# Patient Record
Sex: FEMALE | Race: WHITE | NOT HISPANIC OR LATINO | Employment: UNEMPLOYED | URBAN - METROPOLITAN AREA
[De-identification: names, ages, dates, MRNs, and addresses within clinical notes are randomized per-mention and may not be internally consistent; named-entity substitution may affect disease eponyms.]

---

## 2022-01-28 ENCOUNTER — OFFICE VISIT (OUTPATIENT)
Dept: URGENT CARE | Facility: CLINIC | Age: 47
End: 2022-01-28
Payer: COMMERCIAL

## 2022-01-28 VITALS
DIASTOLIC BLOOD PRESSURE: 100 MMHG | RESPIRATION RATE: 18 BRPM | OXYGEN SATURATION: 98 % | WEIGHT: 172.19 LBS | SYSTOLIC BLOOD PRESSURE: 165 MMHG | HEIGHT: 64 IN | TEMPERATURE: 97 F | HEART RATE: 82 BPM | BODY MASS INDEX: 29.4 KG/M2

## 2022-01-28 DIAGNOSIS — R82.90 ABNORMAL URINE FINDING: ICD-10-CM

## 2022-01-28 DIAGNOSIS — N30.01 ACUTE CYSTITIS WITH HEMATURIA: Primary | ICD-10-CM

## 2022-01-28 DIAGNOSIS — R35.0 URINARY FREQUENCY: ICD-10-CM

## 2022-01-28 DIAGNOSIS — R39.15 URINARY URGENCY: ICD-10-CM

## 2022-01-28 DIAGNOSIS — I10 ELEVATED BLOOD PRESSURE READING WITH DIAGNOSIS OF HYPERTENSION: ICD-10-CM

## 2022-01-28 LAB
BILIRUB UR QL STRIP: POSITIVE
GLUCOSE UR QL STRIP: POSITIVE
KETONES UR QL STRIP: NEGATIVE
LEUKOCYTE ESTERASE UR QL STRIP: NEGATIVE
PH, POC UA: 5
POC BLOOD, URINE: NEGATIVE
POC NITRATES, URINE: POSITIVE
PROT UR QL STRIP: POSITIVE
SP GR UR STRIP: 1.02 (ref 1–1.03)
UROBILINOGEN UR STRIP-ACNC: POSITIVE (ref 0.1–1.1)

## 2022-01-28 PROCEDURE — 1159F MED LIST DOCD IN RCRD: CPT | Mod: CPTII,S$GLB,, | Performed by: NURSE PRACTITIONER

## 2022-01-28 PROCEDURE — 4010F ACE/ARB THERAPY RXD/TAKEN: CPT | Mod: CPTII,S$GLB,, | Performed by: NURSE PRACTITIONER

## 2022-01-28 PROCEDURE — 3008F PR BODY MASS INDEX (BMI) DOCUMENTED: ICD-10-PCS | Mod: CPTII,S$GLB,, | Performed by: NURSE PRACTITIONER

## 2022-01-28 PROCEDURE — 3077F PR MOST RECENT SYSTOLIC BLOOD PRESSURE >= 140 MM HG: ICD-10-PCS | Mod: CPTII,S$GLB,, | Performed by: NURSE PRACTITIONER

## 2022-01-28 PROCEDURE — 3080F PR MOST RECENT DIASTOLIC BLOOD PRESSURE >= 90 MM HG: ICD-10-PCS | Mod: CPTII,S$GLB,, | Performed by: NURSE PRACTITIONER

## 2022-01-28 PROCEDURE — 99203 PR OFFICE/OUTPT VISIT, NEW, LEVL III, 30-44 MIN: ICD-10-PCS | Mod: S$GLB,,, | Performed by: NURSE PRACTITIONER

## 2022-01-28 PROCEDURE — 1160F RVW MEDS BY RX/DR IN RCRD: CPT | Mod: CPTII,S$GLB,, | Performed by: NURSE PRACTITIONER

## 2022-01-28 PROCEDURE — 3008F BODY MASS INDEX DOCD: CPT | Mod: CPTII,S$GLB,, | Performed by: NURSE PRACTITIONER

## 2022-01-28 PROCEDURE — 99203 OFFICE O/P NEW LOW 30 MIN: CPT | Mod: S$GLB,,, | Performed by: NURSE PRACTITIONER

## 2022-01-28 PROCEDURE — 1159F PR MEDICATION LIST DOCUMENTED IN MEDICAL RECORD: ICD-10-PCS | Mod: CPTII,S$GLB,, | Performed by: NURSE PRACTITIONER

## 2022-01-28 PROCEDURE — 81003 POCT URINALYSIS, DIPSTICK, AUTOMATED, W/O SCOPE: ICD-10-PCS | Mod: QW,S$GLB,, | Performed by: NURSE PRACTITIONER

## 2022-01-28 PROCEDURE — 1160F PR REVIEW ALL MEDS BY PRESCRIBER/CLIN PHARMACIST DOCUMENTED: ICD-10-PCS | Mod: CPTII,S$GLB,, | Performed by: NURSE PRACTITIONER

## 2022-01-28 PROCEDURE — 3077F SYST BP >= 140 MM HG: CPT | Mod: CPTII,S$GLB,, | Performed by: NURSE PRACTITIONER

## 2022-01-28 PROCEDURE — 4010F PR ACE/ARB THEARPY RXD/TAKEN: ICD-10-PCS | Mod: CPTII,S$GLB,, | Performed by: NURSE PRACTITIONER

## 2022-01-28 PROCEDURE — 81003 URINALYSIS AUTO W/O SCOPE: CPT | Mod: QW,S$GLB,, | Performed by: NURSE PRACTITIONER

## 2022-01-28 PROCEDURE — 3080F DIAST BP >= 90 MM HG: CPT | Mod: CPTII,S$GLB,, | Performed by: NURSE PRACTITIONER

## 2022-01-28 RX ORDER — PRAZOSIN HYDROCHLORIDE 1 MG/1
CAPSULE ORAL 2 TIMES DAILY
COMMUNITY
End: 2024-01-18

## 2022-01-28 RX ORDER — PHENAZOPYRIDINE HYDROCHLORIDE 200 MG/1
200 TABLET, FILM COATED ORAL 3 TIMES DAILY PRN
Qty: 9 TABLET | Refills: 0 | Status: SHIPPED | OUTPATIENT
Start: 2022-01-28 | End: 2022-01-28

## 2022-01-28 RX ORDER — LISINOPRIL 20 MG/1
20 TABLET ORAL DAILY
COMMUNITY
End: 2022-06-29 | Stop reason: CLARIF

## 2022-01-28 RX ORDER — NITROFURANTOIN 25; 75 MG/1; MG/1
100 CAPSULE ORAL 2 TIMES DAILY
Qty: 14 CAPSULE | Refills: 0 | Status: SHIPPED | OUTPATIENT
Start: 2022-01-28 | End: 2022-02-04

## 2022-01-28 RX ORDER — CLONAZEPAM 1 MG/1
1 TABLET ORAL 2 TIMES DAILY PRN
COMMUNITY
End: 2022-06-29

## 2022-01-28 RX ORDER — PHENAZOPYRIDINE HYDROCHLORIDE 200 MG/1
200 TABLET, FILM COATED ORAL 3 TIMES DAILY PRN
Qty: 9 TABLET | Refills: 0 | Status: SHIPPED | OUTPATIENT
Start: 2022-01-28 | End: 2022-01-31

## 2022-01-28 RX ORDER — BUPROPION HYDROCHLORIDE 150 MG/1
150 TABLET ORAL DAILY
COMMUNITY
End: 2023-03-14

## 2022-01-28 NOTE — PROGRESS NOTES
"Subjective:       Patient ID: Dahiana Rivera is a 46 y.o. female.    Vitals:  height is 5' 4" (1.626 m) and weight is 78.1 kg (172 lb 3.2 oz). Her temperature is 97.4 °F (36.3 °C). Her blood pressure is 165/100 (abnormal) and her pulse is 82. Her respiration is 18 and oxygen saturation is 98%.     Chief Complaint: Urinary Frequency    Urinary Frequency   This is a new problem. Episode onset: 5 days. The problem has been gradually worsening. The quality of the pain is described as burning. Associated symptoms include frequency and urgency. She has tried increased fluids (AZO) for the symptoms. The treatment provided no relief. Her past medical history is significant for recurrent UTIs.       Genitourinary: Positive for frequency and urgency.       Objective:      Physical Exam   Constitutional: She is oriented to person, place, and time. She appears well-developed and well-nourished.   HENT:   Head: Normocephalic and atraumatic.   Ears:   Right Ear: External ear normal.   Left Ear: External ear normal.   Nose: Nose normal. No nasal deformity. No epistaxis.   Mouth/Throat: Oropharynx is clear and moist and mucous membranes are normal.   Eyes: Lids are normal.   Neck: Trachea normal and phonation normal. Neck supple.   Cardiovascular: Normal pulses.   Pulmonary/Chest: Effort normal.   Abdominal: Normal appearance and bowel sounds are normal. She exhibits no distension. Soft. There is no abdominal tenderness. There is no CVA tenderness, no left CVA tenderness and no right CVA tenderness.   Neurological: She is alert and oriented to person, place, and time.   Skin: Skin is warm, dry and intact.   Psychiatric: She has a normal mood and affect. Her speech is normal and behavior is normal. Cognition and memory  Nursing note and vitals reviewed.        Results for orders placed or performed in visit on 01/28/22   POCT Urinalysis, Dipstick, Automated, W/O Scope   Result Value Ref Range    POC Blood, Urine Negative Negative "    POC Bilirubin, Urine Positive (A) Negative    POC Urobilinogen, Urine positive 0.1 - 1.1    POC Ketones, Urine Negative Negative    POC Protein, Urine Positive (A) Negative    POC Nitrates, Urine Positive (A) Negative    POC Glucose, Urine Positive (A) Negative    pH, UA 5.0     POC Specific Gravity, Urine 1.020 1.003 - 1.029    POC Leukocytes, Urine Negative Negative       Assessment:       1. Acute cystitis with hematuria    2. Urinary frequency    3. Urinary urgency    4. Elevated blood pressure reading with diagnosis of hypertension    5. Abnormal urine finding          Plan:     UTI positive for bilirubin, protein, nitrates, and glucose. Will treat for UTI. Low suspicion for pyelonephritis.   Acute cystitis with hematuria  -     Discontinue: phenazopyridine (PYRIDIUM) 200 MG tablet; Take 1 tablet (200 mg total) by mouth 3 (three) times daily as needed for Pain.  Dispense: 9 tablet; Refill: 0  -     nitrofurantoin, macrocrystal-monohydrate, (MACROBID) 100 MG capsule; Take 1 capsule (100 mg total) by mouth 2 (two) times daily. for 7 days  Dispense: 14 capsule; Refill: 0  -     phenazopyridine (PYRIDIUM) 200 MG tablet; Take 1 tablet (200 mg total) by mouth 3 (three) times daily as needed for Pain.  Dispense: 9 tablet; Refill: 0    Urinary frequency  -     POCT Urinalysis, Dipstick, Automated, W/O Scope  -     Discontinue: phenazopyridine (PYRIDIUM) 200 MG tablet; Take 1 tablet (200 mg total) by mouth 3 (three) times daily as needed for Pain.  Dispense: 9 tablet; Refill: 0  -     nitrofurantoin, macrocrystal-monohydrate, (MACROBID) 100 MG capsule; Take 1 capsule (100 mg total) by mouth 2 (two) times daily. for 7 days  Dispense: 14 capsule; Refill: 0    Urinary urgency  -     POCT Urinalysis, Dipstick, Automated, W/O Scope    Elevated blood pressure reading with diagnosis of hypertension    Abnormal urine finding          Patient Instructions                                                                        UTI   If your condition worsens or fails to improve we recommend that you receive another evaluation at the ER immediately or contact your PCP to discuss your concerns or return here. You must understand that you've received an urgent care treatment only and that you may be released before all your medical problems are known or treated. You the patient will arrange for followup care as instructed.   If you were prescribed antibiotics, please take them to full completion.    If you had cultures done it will take 3-5 days to result. We will call you with the result.   If you are are female and on BCP use additional methods to prevent pregnancy while on the antibiotics and for one cycle after.   Cranberry juice may help. Get the 100% cranberry juice and mix 4 oz of juice with 4 oz of water and drink this 8 oz glass of liquid once a day.

## 2022-06-29 ENCOUNTER — OFFICE VISIT (OUTPATIENT)
Dept: FAMILY MEDICINE | Facility: CLINIC | Age: 47
End: 2022-06-29
Payer: COMMERCIAL

## 2022-06-29 VITALS
RESPIRATION RATE: 18 BRPM | OXYGEN SATURATION: 100 % | BODY MASS INDEX: 29.02 KG/M2 | TEMPERATURE: 98 F | WEIGHT: 170 LBS | HEART RATE: 97 BPM | HEIGHT: 64 IN

## 2022-06-29 DIAGNOSIS — I10 PRIMARY HYPERTENSION: ICD-10-CM

## 2022-06-29 DIAGNOSIS — Z12.11 SCREENING FOR MALIGNANT NEOPLASM OF COLON: Primary | ICD-10-CM

## 2022-06-29 DIAGNOSIS — F31.9 BIPOLAR AFFECTIVE DISORDER, REMISSION STATUS UNSPECIFIED: ICD-10-CM

## 2022-06-29 DIAGNOSIS — Z72.0 TOBACCO USER: ICD-10-CM

## 2022-06-29 DIAGNOSIS — Z13.1 SCREENING FOR DIABETES MELLITUS: ICD-10-CM

## 2022-06-29 DIAGNOSIS — M05.80 POLYARTHRITIS WITH POSITIVE RHEUMATOID FACTOR: ICD-10-CM

## 2022-06-29 DIAGNOSIS — M25.50 POLYARTHRALGIA: ICD-10-CM

## 2022-06-29 DIAGNOSIS — Z12.39 ENCOUNTER FOR SCREENING FOR MALIGNANT NEOPLASM OF BREAST, UNSPECIFIED SCREENING MODALITY: ICD-10-CM

## 2022-06-29 PROCEDURE — 3008F PR BODY MASS INDEX (BMI) DOCUMENTED: ICD-10-PCS | Mod: CPTII,,, | Performed by: FAMILY MEDICINE

## 2022-06-29 PROCEDURE — 1159F MED LIST DOCD IN RCRD: CPT | Mod: CPTII,,, | Performed by: FAMILY MEDICINE

## 2022-06-29 PROCEDURE — 4010F ACE/ARB THERAPY RXD/TAKEN: CPT | Mod: CPTII,,, | Performed by: FAMILY MEDICINE

## 2022-06-29 PROCEDURE — 1160F RVW MEDS BY RX/DR IN RCRD: CPT | Mod: CPTII,,, | Performed by: FAMILY MEDICINE

## 2022-06-29 PROCEDURE — 99204 PR OFFICE/OUTPT VISIT, NEW, LEVL IV, 45-59 MIN: ICD-10-PCS | Mod: S$PBB,,, | Performed by: FAMILY MEDICINE

## 2022-06-29 PROCEDURE — 3008F BODY MASS INDEX DOCD: CPT | Mod: CPTII,,, | Performed by: FAMILY MEDICINE

## 2022-06-29 PROCEDURE — 99204 OFFICE O/P NEW MOD 45 MIN: CPT | Mod: S$PBB,,, | Performed by: FAMILY MEDICINE

## 2022-06-29 PROCEDURE — 1159F PR MEDICATION LIST DOCUMENTED IN MEDICAL RECORD: ICD-10-PCS | Mod: CPTII,,, | Performed by: FAMILY MEDICINE

## 2022-06-29 PROCEDURE — 1160F PR REVIEW ALL MEDS BY PRESCRIBER/CLIN PHARMACIST DOCUMENTED: ICD-10-PCS | Mod: CPTII,,, | Performed by: FAMILY MEDICINE

## 2022-06-29 PROCEDURE — 4010F PR ACE/ARB THEARPY RXD/TAKEN: ICD-10-PCS | Mod: CPTII,,, | Performed by: FAMILY MEDICINE

## 2022-06-29 PROCEDURE — 99215 OFFICE O/P EST HI 40 MIN: CPT | Mod: PBBFAC | Performed by: FAMILY MEDICINE

## 2022-06-29 RX ORDER — DEXTROAMPHETAMINE SACCHARATE, AMPHETAMINE ASPARTATE, DEXTROAMPHETAMINE SULFATE AND AMPHETAMINE SULFATE 7.5; 7.5; 7.5; 7.5 MG/1; MG/1; MG/1; MG/1
1 TABLET ORAL 2 TIMES DAILY
COMMUNITY
Start: 2022-06-23

## 2022-06-29 RX ORDER — LISINOPRIL 30 MG/1
30 TABLET ORAL DAILY
Qty: 90 TABLET | Refills: 3 | Status: SHIPPED | OUTPATIENT
Start: 2022-06-29 | End: 2023-01-11 | Stop reason: ALTCHOICE

## 2022-06-29 RX ORDER — ARIPIPRAZOLE 2 MG/1
2 TABLET ORAL DAILY
COMMUNITY
Start: 2022-06-16 | End: 2024-01-18

## 2022-06-29 RX ORDER — CLONAZEPAM 1 MG/1
1 TABLET ORAL 2 TIMES DAILY
COMMUNITY

## 2022-06-29 RX ORDER — MELOXICAM 15 MG/1
15 TABLET ORAL DAILY PRN
Qty: 30 TABLET | Refills: 11 | Status: SHIPPED | OUTPATIENT
Start: 2022-06-29 | End: 2023-06-29

## 2022-06-29 RX ORDER — LISINOPRIL 10 MG/1
20 TABLET ORAL DAILY
COMMUNITY
End: 2022-06-29

## 2022-06-29 NOTE — PROGRESS NOTES
[facility]  [address] [address]  Phone: [phone]   Subjective:      Patient ID: Dahiana Rivera is a 46 y.o. female.    Chief Complaint: Establish Care and Leg Pain    Problem List Items Addressed This Visit        Psychiatric    Bipolar disorder    Overview     Has psychiatrist  On wellbutrin  Denies suicidal or homicidal ideations  Grandmother 98 yo  May 6th (GM  was 5/5); pt appropriately saddended              Cardiac/Vascular    Hypertension    Overview     The patient presents with essential hypertension.  The patient is tolerating the medication well and is in excellent compliance.  The patient is experiencing no side effects.  Counseling was offered regarding low salt diets.  The patient has a reduced salt intake.  The patient denies chest pain, palpitations, shortness of breath, dyspnea on exertion, left or murmur neck pain, nausea, vomiting, diaphoresis, paroxysmal nocturnal dyspnea, and orthopnea.   Hypertension Medications             lisinopriL 10 MG tablet Take 20 mg by mouth once daily.    prazosin (MINIPRESS) 1 MG Cap Take by mouth 2 (two) times daily.      [facility] -[facility] l[facility]           Relevant Medications    Increase to lisinopriL (PRINIVIL,ZESTRIL) 30 MG tablet    Other Relevant Orders    Comprehensive Metabolic Panel    TSH    Lipid Panel    CBC auto differential    Urinalysis       Endocrine    Screening for diabetes mellitus    Relevant Orders    Hemoglobin A1C       Orthopedic    Polyarthralgia    Overview     Pain in b/l hands with swelling, left ankle, right knee and left hip  Endorses morning stiffness for >30 mins but worse pain at night'  Pain 8/10 at worst  Mother has RA  C/o sciatic type pain today           Relevant Medications    Start meloxicam (MOBIC) 15 MG tablet    Other Relevant Orders    Sedimentation rate    C-reactive protein    Cyclic citrul peptide antibody,  IgG    CHAY IgG by IFA    Rheumatoid Factors, IgA, IgG, IgM       Other    Tobacco user    Overview     1/2 ppd x 20 yrs with 1 yr cessation             Other Visit Diagnoses     Screening for malignant neoplasm of colon    -  Primary    Relevant Orders    Ambulatory referral/consult to Gastroenterology          The patient's Health Maintenance was reviewed and the following appears to be due:   Health Maintenance Due   Topic Date Due    Hepatitis C Screening  Never done    Cervical Cancer Screening  Never done    Lipid Panel  Never done    COVID-19 Vaccine (1) Never done    Pneumococcal Vaccines (Age 0-64) (1 - PCV) Never done    Mammogram  Never done    Colorectal Cancer Screening  Never done       (PHQ-2 data if performed)  Depression Patient Health Questionnaire 6/29/2022   Over the last two weeks how often have you been bothered by little interest or pleasure in doing things Nearly every day   Over the last two weeks how often have you been bothered by feeling down, depressed or hopeless Nearly every day   PHQ-2 Total Score 6   Over the last two weeks how often have you been bothered by trouble falling or staying asleep, or sleeping too much More than half the days   Over the last two weeks how often have you been bothered by feeling tired or having little energy More than half the days   Over the last two weeks how often have you been bothered by a poor appetite or overeating More than half the days   Over the last two weeks how often have you been bothered by feeling bad about yourself - or that you are a failure or have let yourself or your family down More than half the days   Over the last two weeks how often have you been bothered by trouble concentrating on things, such as reading the newspaper or watching television Not at all   Over the last two weeks how often have you been bothered by moving or speaking so slowly that other people could have noticed. Or the opposite - being so fidgety or  restless that you have been moving around a lot more than usual. Not at all   Over the last two weeks how often have you been bothered by thoughts that you would be better off dead, or of hurting yourself Not at all   Total Score 14   Interpretation Moderate     (PHQ-9 data if performed)  PHQ9 2022   Total Score 14     (JAY JAY data if performed)  GAD7 2022   1. Feeling nervous, anxious, or on edge? 2   2. Not being able to stop or control worrying? 2   3. Worrying too much about different things? 1   4. Trouble relaxing? 2   5. Being so restless that it is hard to sit still? 2   6. Becoming easily annoyed or irritable? 1   7. Feeling afraid as if something awful might happen? 1   8. If you checked off any problems, how difficult have these problems made it for you to do your work, take care of things at home, or get along with other people? 2   JAY JAY-7 Score 11        Past Medical History:  Past Medical History:   Diagnosis Date    Bipolar disorder     Depression     Hypertension     PTSD (post-traumatic stress disorder)      Past Surgical History:   Procedure Laterality Date     SECTION      GANGLION CYST EXCISION      HEMORRHOID SURGERY      TONSILLECTOMY       Review of patient's allergies indicates:   Allergen Reactions    Morphine     Opioids - morphine analogues      Current Outpatient Medications on File Prior to Visit   Medication Sig Dispense Refill    ARIPiprazole (ABILIFY) 2 MG Tab Take 2 mg by mouth once daily.      buPROPion (WELLBUTRIN XL) 150 MG TB24 tablet Take 150 mg by mouth once daily.      clonazePAM (KLONOPIN) 1 MG tablet Take 1 mg by mouth 2 (two) times a day.      dextroamphetamine-amphetamine 30 mg Tab Take 1 tablet by mouth 2 (two) times daily.      prazosin (MINIPRESS) 1 MG Cap Take by mouth 2 (two) times daily.      [DISCONTINUED] clonazePAM (KLONOPIN) 1 MG tablet Take 1 mg by mouth 2 (two) times daily as needed for Anxiety.      [DISCONTINUED] lisinopriL  (PRINIVIL,ZESTRIL) 20 MG tablet Take 20 mg by mouth once daily.      [DISCONTINUED] lisinopriL 10 MG tablet Take 20 mg by mouth once daily.       No current facility-administered medications on file prior to visit.     Social History     Socioeconomic History    Marital status: Single   Tobacco Use    Smoking status: Current Every Day Smoker     Packs/day: 0.50     Types: Cigarettes    Smokeless tobacco: Never Used   Substance and Sexual Activity    Alcohol use: Not Currently    Drug use: Never    Sexual activity: Not Currently     Family History   Problem Relation Age of Onset    Hypertension Maternal Grandfather     Hypertension Paternal Grandfather     Rheum arthritis Mother     Rheum arthritis Father     Cancer Maternal Aunt     Cancer Maternal Grandmother        Review of Systems   Constitutional: Negative for chills, fatigue and fever.   HENT: Negative for congestion, hearing loss, rhinorrhea, sore throat and voice change.    Eyes: Negative for visual disturbance.   Respiratory: Negative for cough, shortness of breath and wheezing.    Cardiovascular: Negative for chest pain, palpitations and leg swelling.   Gastrointestinal: Negative for abdominal pain, blood in stool, constipation, diarrhea, nausea and vomiting.   Endocrine: Negative for cold intolerance, heat intolerance, polydipsia, polyphagia and polyuria.   Genitourinary: Negative for decreased urine volume, difficulty urinating, flank pain, frequency, hematuria and urgency.   Musculoskeletal: Positive for arthralgias and joint swelling. Negative for gait problem and myalgias.        Pain in b/l hands with swelling, left ankle, right knee and left hip   Skin: Negative for rash.   Neurological: Negative for dizziness, seizures, speech difficulty, weakness, numbness and headaches.   Hematological: Negative for adenopathy.   Psychiatric/Behavioral: Negative.  Negative for decreased concentration, dysphoric mood, hallucinations and suicidal  "ideas. The patient is not nervous/anxious.    All other systems reviewed and are negative.      Objective:   Pulse 97   Temp 97.9 °F (36.6 °C) (Oral)   Resp 18   Ht 5' 4" (1.626 m)   Wt 77.1 kg (170 lb)   SpO2 100%   BMI 29.18 kg/m²   Physical Exam  Vitals and nursing note reviewed.   Constitutional:       General: She is not in acute distress.     Appearance: Normal appearance. She is normal weight. She is not ill-appearing.   HENT:      Head: Normocephalic and atraumatic.      Right Ear: Tympanic membrane, ear canal and external ear normal.      Left Ear: Tympanic membrane, ear canal and external ear normal.      Nose: Nose normal.      Mouth/Throat:      Mouth: Mucous membranes are moist.   Eyes:      Extraocular Movements: Extraocular movements intact.      Conjunctiva/sclera: Conjunctivae normal.      Pupils: Pupils are equal, round, and reactive to light.   Cardiovascular:      Rate and Rhythm: Normal rate and regular rhythm.      Pulses: Normal pulses.      Heart sounds: Normal heart sounds. No murmur heard.    No friction rub. No gallop.   Pulmonary:      Effort: Pulmonary effort is normal. No respiratory distress.      Breath sounds: Normal breath sounds. No wheezing, rhonchi or rales.   Abdominal:      General: Abdomen is flat. Bowel sounds are normal.      Palpations: Abdomen is soft. There is no mass.      Tenderness: There is no abdominal tenderness. There is no guarding or rebound.      Hernia: No hernia is present.   Musculoskeletal:         General: Tenderness (TTP in b/l hands MCP and PIP jts,, left ankle, right knee and left hip) present. Swelling: b/l hands and right knee- mild. Normal range of motion.      Cervical back: Normal range of motion and neck supple.      Right lower leg: No edema.      Left lower leg: No edema.   Skin:     General: Skin is warm and dry.      Capillary Refill: Capillary refill takes less than 2 seconds.      Findings: No lesion or rash.   Neurological:      " General: No focal deficit present.      Mental Status: She is alert and oriented to person, place, and time. Mental status is at baseline.      Cranial Nerves: No cranial nerve deficit.      Sensory: No sensory deficit.      Gait: Gait normal.   Psychiatric:         Mood and Affect: Mood normal.         Behavior: Behavior normal.         Thought Content: Thought content normal.         Judgment: Judgment normal.          No visits with results within 1 Month(s) from this visit.   Latest known visit with results is:   Office Visit on 01/28/2022   Component Date Value Ref Range Status    POC Blood, Urine 01/28/2022 Negative  Negative Final    POC Bilirubin, Urine 01/28/2022 Positive (A) Negative Final    3.0    POC Urobilinogen, Urine 01/28/2022 positive  0.1 - 1.1 Final    POC Ketones, Urine 01/28/2022 Negative  Negative Final    POC Protein, Urine 01/28/2022 Positive (A) Negative Final    300    POC Nitrates, Urine 01/28/2022 Positive (A) Negative Final    POC Glucose, Urine 01/28/2022 Positive (A) Negative Final    500    pH, UA 01/28/2022 5.0   Final    POC Specific Gravity, Urine 01/28/2022 1.020  1.003 - 1.029 Final    POC Leukocytes, Urine 01/28/2022 Negative  Negative Final      Assessment:     1. Screening for malignant neoplasm of colon    2. Primary hypertension    3. Polyarthralgia    4. Bipolar affective disorder, remission status unspecified    5. Encounter for screening for malignant neoplasm of breast, unspecified screening modality    6. Screening for diabetes mellitus    7. Tobacco user      Plan:   I am having Dahiana Rivera start on lisinopriL  30 mg Daily and meloxicam. I am also having her maintain her buPROPion, prazosin, ARIPiprazole, clonazePAM, and dextroamphetamine-amphetamine.  Problem List Items Addressed This Visit        Psychiatric    Bipolar disorder       Cardiac/Vascular    Hypertension    Relevant Medications    lisinopriL (PRINIVIL,ZESTRIL) 30 MG tablet    Other Relevant  Orders    Comprehensive Metabolic Panel    TSH    Lipid Panel    CBC auto differential    Urinalysis       Endocrine    Screening for diabetes mellitus    Relevant Orders    Hemoglobin A1C       Orthopedic    Polyarthralgia    Relevant Medications    meloxicam (MOBIC) 15 MG tablet    Other Relevant Orders    Sedimentation rate    C-reactive protein    Cyclic citrul peptide antibody, IgG    CHAY IgG by IFA    Rheumatoid Factors, IgA, IgG, IgM       Other    Tobacco user      Other Visit Diagnoses     Screening for malignant neoplasm of colon    -  Primary    Relevant Orders    Ambulatory referral/consult to Gastroenterology        No follow-ups on file.      Medication List with Changes/Refills   New Medications    LISINOPRIL (PRINIVIL,ZESTRIL) 30 MG TABLET    Take 1 tablet (30 mg total) by mouth once daily.    MELOXICAM (MOBIC) 15 MG TABLET    Take 1 tablet (15 mg total) by mouth daily as needed for Pain.   Current Medications    ARIPIPRAZOLE (ABILIFY) 2 MG TAB    Take 2 mg by mouth once daily.    BUPROPION (WELLBUTRIN XL) 150 MG TB24 TABLET    Take 150 mg by mouth once daily.    CLONAZEPAM (KLONOPIN) 1 MG TABLET    Take 1 mg by mouth 2 (two) times a day.    DEXTROAMPHETAMINE-AMPHETAMINE 30 MG TAB    Take 1 tablet by mouth 2 (two) times daily.    PRAZOSIN (MINIPRESS) 1 MG CAP    Take by mouth 2 (two) times daily.   Discontinued Medications    CLONAZEPAM (KLONOPIN) 1 MG TABLET    Take 1 mg by mouth 2 (two) times daily as needed for Anxiety.    LISINOPRIL (PRINIVIL,ZESTRIL) 20 MG TABLET    Take 20 mg by mouth once daily.    LISINOPRIL 10 MG TABLET    Take 20 mg by mouth once daily.            Orders Placed This Encounter   Procedures    Mammo Digital Screening Bilat     Standing Status:   Future     Standing Expiration Date:   6/29/2024     Order Specific Question:   May the Radiologist modify the order per protocol to meet the clinical needs of the patient?     Answer:   Yes     Order Specific Question:   Release to  patient     Answer:   Immediate    Comprehensive Metabolic Panel     Standing Status:   Future     Standing Expiration Date:   8/28/2023    TSH     Standing Status:   Future     Standing Expiration Date:   8/28/2023    Lipid Panel     Standing Status:   Future     Standing Expiration Date:   8/28/2023    Hemoglobin A1C     Standing Status:   Future     Standing Expiration Date:   8/28/2023    CBC auto differential     Standing Status:   Future     Standing Expiration Date:   8/28/2023    Sedimentation rate     Standing Status:   Future     Standing Expiration Date:   8/28/2023    C-reactive protein     Standing Status:   Future     Standing Expiration Date:   8/28/2023    Cyclic citrul peptide antibody, IgG     Standing Status:   Future     Standing Expiration Date:   8/28/2023    Urinalysis    CHAY IgG by IFA     Standing Status:   Future     Standing Expiration Date:   8/28/2023    Rheumatoid Factors, IgA, IgG, IgM     Standing Status:   Future     Standing Expiration Date:   8/28/2023    Ambulatory referral/consult to Gastroenterology     Standing Status:   Future     Standing Expiration Date:   7/29/2023     Referral Priority:   Routine     Referral Type:   Consultation     Referral Reason:   Specialty Services Required     Referred to Provider:   Stefan Blake MD     Requested Specialty:   Gastroenterology     Number of Visits Requested:   1     Previous medical history/lab work/radiology reviewed and considered during medical management decisions.   Medication list reviewed and medication reconciliation performed.  Patient was provided  and care about his/her current diagnosis (es) and medications including risk/benefit and side effects/adverse events, over the counter medication uses/doses, home self-care and contact precautions,  and red flags and indications for when to seek immediate medical attention.   Patient was advised to continue compliance with current medication list and  medical recommendations.  Recommended/ Advised continued compliance with recommended eating habits/ diets for medical conditions and exercise 150 minutes/ week (if possible) for medical condition (s).  Educational handouts and instructions on selected disease management in AVS (After Visit Summary).  All of the patient's questions were answered to patient's satisfaction.   The patient was receptive, expressed verbal understanding and agreement the above plan.    This note was created with the assistance of  M*Modal Fluency Direct voice recognition or  phone dictation. Please excuse any typographical errors that might have transpired. There may be transcription errors as a result of using this technology, however minimal effort has been made to assure accuracy of transcription, but any obvious errors or omissions should be clarified with the author of the document      Most recent labs reviewed (7/5/2022)and are within normal limits except Elevated  Liver Function Tests with AST/ALT 50/63 but normal alkaline phosphatase; rheumatoid factor IgM is elevated 12 but CCP negative; thyroid-TSH elevated 4.99.  Medical management  to be addressed by referred to IM or FM provider on future visit with recommendations:  Repeat TSH with free T3 and free T4; repeat liver function tests with ultrasound of abdomen limited.  Patient will be referred to rheumatology for polyarthralgia with increased IgM rheumatoid factor 12 and family history of rheumatoid arthritis (mother).  .     Follow up with  administrative management referral to Internal Medicine or Family Medicine (as of 7/8/2022) for  medical conditions above in 6 wks from this visit  Regi Castellanos MD

## 2022-07-05 ENCOUNTER — APPOINTMENT (OUTPATIENT)
Dept: FAMILY MEDICINE | Facility: CLINIC | Age: 47
End: 2022-07-05
Payer: COMMERCIAL

## 2022-07-05 DIAGNOSIS — Z13.1 SCREENING FOR DIABETES MELLITUS: ICD-10-CM

## 2022-07-05 DIAGNOSIS — M25.50 POLYARTHRALGIA: ICD-10-CM

## 2022-07-05 DIAGNOSIS — I10 PRIMARY HYPERTENSION: ICD-10-CM

## 2022-07-05 LAB
ALBUMIN SERPL-MCNC: 3.8 GM/DL (ref 3.5–5)
ALBUMIN/GLOB SERPL: 1.1 RATIO (ref 1.1–2)
ALP SERPL-CCNC: 83 UNIT/L (ref 40–150)
ALT SERPL-CCNC: 63 UNIT/L (ref 0–55)
AST SERPL-CCNC: 50 UNIT/L (ref 5–34)
BASOPHILS # BLD AUTO: 0.03 X10(3)/MCL (ref 0–0.2)
BASOPHILS NFR BLD AUTO: 0.6 %
BILIRUBIN DIRECT+TOT PNL SERPL-MCNC: 0.8 MG/DL
BUN SERPL-MCNC: 13.2 MG/DL (ref 7–18.7)
CALCIUM SERPL-MCNC: 10.1 MG/DL (ref 8.4–10.2)
CHLORIDE SERPL-SCNC: 104 MMOL/L (ref 98–107)
CHOLEST SERPL-MCNC: 189 MG/DL
CHOLEST/HDLC SERPL: 3 {RATIO} (ref 0–5)
CO2 SERPL-SCNC: 31 MMOL/L (ref 22–29)
CREAT SERPL-MCNC: 0.82 MG/DL (ref 0.55–1.02)
CRP SERPL-MCNC: 0.7 MG/L
EOSINOPHIL # BLD AUTO: 0.2 X10(3)/MCL (ref 0–0.9)
EOSINOPHIL NFR BLD AUTO: 4.2 %
ERYTHROCYTE [DISTWIDTH] IN BLOOD BY AUTOMATED COUNT: 14.1 % (ref 11.5–17)
ERYTHROCYTE [SEDIMENTATION RATE] IN BLOOD: 8 MM/HR (ref 0–20)
EST. AVERAGE GLUCOSE BLD GHB EST-MCNC: 114 MG/DL
GLOBULIN SER-MCNC: 3.4 GM/DL (ref 2.4–3.5)
GLUCOSE SERPL-MCNC: 90 MG/DL (ref 74–100)
HBA1C MFR BLD: 5.6 %
HCT VFR BLD AUTO: 40.8 % (ref 37–47)
HDLC SERPL-MCNC: 71 MG/DL (ref 35–60)
HGB BLD-MCNC: 13.2 GM/DL (ref 12–16)
IMM GRANULOCYTES # BLD AUTO: 0 X10(3)/MCL (ref 0–0.04)
IMM GRANULOCYTES NFR BLD AUTO: 0 %
LDLC SERPL CALC-MCNC: 105 MG/DL (ref 50–140)
LYMPHOCYTES # BLD AUTO: 1.69 X10(3)/MCL (ref 0.6–4.6)
LYMPHOCYTES NFR BLD AUTO: 35.8 %
MCH RBC QN AUTO: 29.1 PG (ref 27–31)
MCHC RBC AUTO-ENTMCNC: 32.4 MG/DL (ref 33–36)
MCV RBC AUTO: 90.1 FL (ref 80–94)
MONOCYTES # BLD AUTO: 0.53 X10(3)/MCL (ref 0.1–1.3)
MONOCYTES NFR BLD AUTO: 11.2 %
NEUTROPHILS # BLD AUTO: 2.3 X10(3)/MCL (ref 2.1–9.2)
NEUTROPHILS NFR BLD AUTO: 48.2 %
NRBC BLD AUTO-RTO: 0 %
PLATELET # BLD AUTO: 226 X10(3)/MCL (ref 130–400)
PMV BLD AUTO: 10.1 FL (ref 7.4–10.4)
POTASSIUM SERPL-SCNC: 4.4 MMOL/L (ref 3.5–5.1)
PROT SERPL-MCNC: 7.2 GM/DL (ref 6.4–8.3)
RBC # BLD AUTO: 4.53 X10(6)/MCL (ref 4.2–5.4)
SODIUM SERPL-SCNC: 140 MMOL/L (ref 136–145)
T4 FREE SERPL-MCNC: 0.82 NG/DL (ref 0.7–1.48)
TRIGL SERPL-MCNC: 65 MG/DL (ref 37–140)
TSH SERPL-ACNC: 5 UIU/ML (ref 0.35–4.94)
VLDLC SERPL CALC-MCNC: 13 MG/DL
WBC # SPEC AUTO: 4.7 X10(3)/MCL (ref 4.5–11.5)

## 2022-07-05 PROCEDURE — 80053 COMPREHEN METABOLIC PANEL: CPT

## 2022-07-05 PROCEDURE — 80061 LIPID PANEL: CPT

## 2022-07-05 PROCEDURE — 86140 C-REACTIVE PROTEIN: CPT

## 2022-07-05 PROCEDURE — 84439 ASSAY OF FREE THYROXINE: CPT

## 2022-07-05 PROCEDURE — 86200 CCP ANTIBODY: CPT

## 2022-07-05 PROCEDURE — 36415 COLL VENOUS BLD VENIPUNCTURE: CPT

## 2022-07-05 PROCEDURE — 84443 ASSAY THYROID STIM HORMONE: CPT

## 2022-07-05 PROCEDURE — 83516 IMMUNOASSAY NONANTIBODY: CPT

## 2022-07-06 LAB
ANA SER QL HEP2 SUBST: NORMAL
CCP IGG SERPL-ACNC: 16 UNITS

## 2022-07-08 LAB
RF IGA SER-ACNC: <5 UNITS
RF IGG SER-ACNC: <5 UNITS
RF IGM SER-ACNC: 12 UNITS

## 2022-08-08 ENCOUNTER — TELEPHONE (OUTPATIENT)
Dept: FAMILY MEDICINE | Facility: CLINIC | Age: 47
End: 2022-08-08
Payer: COMMERCIAL

## 2022-08-08 NOTE — TELEPHONE ENCOUNTER
Patient missed appt on 8/4/22 with Kinsey Sadler NP . Will discuss this with patient on 8/17/22 appt.              ----- Message from Regi Castellanos MD sent at 8/5/2022  6:49 PM CDT -----  Regarding: abnormal lab results and additional  management needed  Please notify the patient of labs results:    Most recent labs reviewed (7/5/2022)and are within normal limits except Elevated  Liver Function Tests with AST/ALT 50/63 but normal alkaline phosphatase; rheumatoid factor IgM is elevated 12 but CCP negative; thyroid-TSH elevated 4.99.  Patient has been referred to rheumatology for polyarthralgia with increased IgM rheumatoid factor 12 and family history of rheumatoid arthritis (mother).   Medical management  to be addressed by referred to IM or FM provider (news provider) on future visit with recommendations:  Repeat TSH with free T3 and free T4; repeat liver function tests with ultrasound of abdomen limited      PAR:  Please  notify clinic leaders of need for scheduling.   Please have patient scheduled with new provider within 1 -2 weeks , if not already done.     .          .

## 2022-08-17 ENCOUNTER — HOSPITAL ENCOUNTER (OUTPATIENT)
Dept: RADIOLOGY | Facility: HOSPITAL | Age: 47
Discharge: HOME | End: 2022-08-17
Attending: NURSE PRACTITIONER
Payer: COMMERCIAL

## 2022-08-17 ENCOUNTER — HOSPITAL ENCOUNTER (OUTPATIENT)
Dept: RADIOLOGY | Facility: HOSPITAL | Age: 47
Discharge: HOME | End: 2022-08-17
Attending: FAMILY MEDICINE
Payer: COMMERCIAL

## 2022-08-17 ENCOUNTER — OFFICE VISIT (OUTPATIENT)
Dept: FAMILY MEDICINE | Facility: CLINIC | Age: 47
End: 2022-08-17
Payer: COMMERCIAL

## 2022-08-17 VITALS
RESPIRATION RATE: 18 BRPM | HEIGHT: 64 IN | OXYGEN SATURATION: 100 % | HEART RATE: 86 BPM | WEIGHT: 175 LBS | DIASTOLIC BLOOD PRESSURE: 89 MMHG | TEMPERATURE: 98 F | BODY MASS INDEX: 29.88 KG/M2 | SYSTOLIC BLOOD PRESSURE: 151 MMHG

## 2022-08-17 DIAGNOSIS — R74.8 ELEVATED LIVER ENZYMES: ICD-10-CM

## 2022-08-17 DIAGNOSIS — Z12.39 ENCOUNTER FOR SCREENING FOR MALIGNANT NEOPLASM OF BREAST, UNSPECIFIED SCREENING MODALITY: ICD-10-CM

## 2022-08-17 DIAGNOSIS — B19.20 HEPATITIS C VIRUS INFECTION WITHOUT HEPATIC COMA, UNSPECIFIED CHRONICITY: Primary | ICD-10-CM

## 2022-08-17 DIAGNOSIS — M25.50 POLYARTHRALGIA: ICD-10-CM

## 2022-08-17 DIAGNOSIS — I10 PRIMARY HYPERTENSION: ICD-10-CM

## 2022-08-17 DIAGNOSIS — Z12.4 CERVICAL CANCER SCREENING: ICD-10-CM

## 2022-08-17 DIAGNOSIS — R79.89 ELEVATED TSH: ICD-10-CM

## 2022-08-17 DIAGNOSIS — M25.562 LEFT KNEE PAIN, UNSPECIFIED CHRONICITY: ICD-10-CM

## 2022-08-17 LAB
ALBUMIN SERPL-MCNC: 4 GM/DL (ref 3.5–5)
ALP SERPL-CCNC: 117 UNIT/L (ref 40–150)
ALT SERPL-CCNC: 66 UNIT/L (ref 0–55)
AST SERPL-CCNC: 44 UNIT/L (ref 5–34)
BILIRUBIN DIRECT+TOT PNL SERPL-MCNC: 0.2 MG/DL (ref 0–0.5)
BILIRUBIN DIRECT+TOT PNL SERPL-MCNC: 0.2 MG/DL (ref 0–0.8)
BILIRUBIN DIRECT+TOT PNL SERPL-MCNC: 0.4 MG/DL
GGT SERPL-CCNC: 51 U/L (ref 9–36)
PROT SERPL-MCNC: 7.3 GM/DL (ref 6.4–8.3)
T4 SERPL-MCNC: 8.78 UG/DL (ref 4.87–11.72)
TSH SERPL-ACNC: 2.08 UIU/ML (ref 0.35–4.94)

## 2022-08-17 PROCEDURE — 77063 BREAST TOMOSYNTHESIS BI: CPT | Mod: 26,,, | Performed by: RADIOLOGY

## 2022-08-17 PROCEDURE — 80074 ACUTE HEPATITIS PANEL: CPT | Performed by: NURSE PRACTITIONER

## 2022-08-17 PROCEDURE — 77067 SCR MAMMO BI INCL CAD: CPT | Mod: TC

## 2022-08-17 PROCEDURE — 77063 MAMMO DIGITAL SCREENING BILAT WITH TOMO: ICD-10-PCS | Mod: 26,,, | Performed by: RADIOLOGY

## 2022-08-17 PROCEDURE — 84436 ASSAY OF TOTAL THYROXINE: CPT | Performed by: NURSE PRACTITIONER

## 2022-08-17 PROCEDURE — 4010F ACE/ARB THERAPY RXD/TAKEN: CPT | Mod: CPTII,,, | Performed by: NURSE PRACTITIONER

## 2022-08-17 PROCEDURE — 82977 ASSAY OF GGT: CPT | Performed by: NURSE PRACTITIONER

## 2022-08-17 PROCEDURE — 4010F PR ACE/ARB THEARPY RXD/TAKEN: ICD-10-PCS | Mod: CPTII,,, | Performed by: NURSE PRACTITIONER

## 2022-08-17 PROCEDURE — 3077F PR MOST RECENT SYSTOLIC BLOOD PRESSURE >= 140 MM HG: ICD-10-PCS | Mod: CPTII,,, | Performed by: NURSE PRACTITIONER

## 2022-08-17 PROCEDURE — 99215 OFFICE O/P EST HI 40 MIN: CPT | Mod: PBBFAC | Performed by: NURSE PRACTITIONER

## 2022-08-17 PROCEDURE — 99214 OFFICE O/P EST MOD 30 MIN: CPT | Mod: S$PBB,,, | Performed by: NURSE PRACTITIONER

## 2022-08-17 PROCEDURE — 80076 HEPATIC FUNCTION PANEL: CPT | Performed by: NURSE PRACTITIONER

## 2022-08-17 PROCEDURE — 3079F DIAST BP 80-89 MM HG: CPT | Mod: CPTII,,, | Performed by: NURSE PRACTITIONER

## 2022-08-17 PROCEDURE — 1159F PR MEDICATION LIST DOCUMENTED IN MEDICAL RECORD: ICD-10-PCS | Mod: CPTII,,, | Performed by: NURSE PRACTITIONER

## 2022-08-17 PROCEDURE — 77067 MAMMO DIGITAL SCREENING BILAT WITH TOMO: ICD-10-PCS | Mod: 26,,, | Performed by: RADIOLOGY

## 2022-08-17 PROCEDURE — 3079F PR MOST RECENT DIASTOLIC BLOOD PRESSURE 80-89 MM HG: ICD-10-PCS | Mod: CPTII,,, | Performed by: NURSE PRACTITIONER

## 2022-08-17 PROCEDURE — 84443 ASSAY THYROID STIM HORMONE: CPT | Performed by: NURSE PRACTITIONER

## 2022-08-17 PROCEDURE — 3077F SYST BP >= 140 MM HG: CPT | Mod: CPTII,,, | Performed by: NURSE PRACTITIONER

## 2022-08-17 PROCEDURE — 1159F MED LIST DOCD IN RCRD: CPT | Mod: CPTII,,, | Performed by: NURSE PRACTITIONER

## 2022-08-17 PROCEDURE — 3008F PR BODY MASS INDEX (BMI) DOCUMENTED: ICD-10-PCS | Mod: CPTII,,, | Performed by: NURSE PRACTITIONER

## 2022-08-17 PROCEDURE — 3008F BODY MASS INDEX DOCD: CPT | Mod: CPTII,,, | Performed by: NURSE PRACTITIONER

## 2022-08-17 PROCEDURE — 73562 X-RAY EXAM OF KNEE 3: CPT | Mod: TC,LT

## 2022-08-17 PROCEDURE — 36415 COLL VENOUS BLD VENIPUNCTURE: CPT | Performed by: NURSE PRACTITIONER

## 2022-08-17 PROCEDURE — 77067 SCR MAMMO BI INCL CAD: CPT | Mod: 26,,, | Performed by: RADIOLOGY

## 2022-08-17 PROCEDURE — 99214 PR OFFICE/OUTPT VISIT, EST, LEVL IV, 30-39 MIN: ICD-10-PCS | Mod: S$PBB,,, | Performed by: NURSE PRACTITIONER

## 2022-08-17 NOTE — ASSESSMENT & PLAN NOTE
Labs discussed with patient, referral to Rheumatology has been initiated.  X-ray left knee pending will notify patient of results when they become available.  Continue medication meloxicam 50 mg p.o. as prescribed  Elevate, ice, rest.

## 2022-08-17 NOTE — PROGRESS NOTES
Patient Name: Dahiana Rivera   : 1975  MRN: [id number]     SUBJECTIVE:  Dahiana Rivera is a 46 y.o. female here for Knee Pain (Left knee) and Hypertension  .    46-year-old female presents to the clinic reporting of left knee pain.  Patient was seen on 2022 with the same issue as well with left hip pain possible sciatica.  She reports mother and father has a history of rheumatoid arthritis.  Patient stated this time the pain to the left knee is in the back of the knee and is tender to touch.  Denies any recent fall or trauma.  Patient is ambulating without assistant.  Rheumatology referral has been initiated patient awaiting appointment.  Patient is taken Mobic 15 mg once daily for polyarthralgia.    Blood pressure elevated in the clinic 151/89, patient is on lisinopril 30 mg p.o. once daily.  Patient relate the increase in her blood pressure due to her pain and to left knee and being anxious.  Blood pressure log given to patient, patient will purchase OTC blood pressure machine, patient will come back in 4 weeks for blood pressure evaluation and medication management.    Labs discussed at the bedside.  Will repeat TSH, T3, T4 due to elevated TSH 4.9983.                                                       Will repeat liver enzymes due to elevated AST 63 and ALT 50. Hep panel initiated, GGT lab initiated.  Ultrasound abdomen limited initiated. Patient state her ex- with history of hepatitis-C and also she used to be IV drug user.    Patient denies chest pain, shortness of breath, dyspnea on exertion, palpitations, peripheral edema, abdominal pain, nausea, vomiting, diarrhea, constipation, fatigue, fever, chills, dysuria,  hematuria, melena, or hematochezia        Clinic visit  2022 Polyarthralgia    Pain in b/l hands with swelling, left ankle, right knee and left hip  Endorses morning stiffness for >30 mins but worse pain at night'  Pain 8/10 at worst  Mother has RA  C/o sciatic type pain  "today  Rx mobic 15mg.              ALLERGIES:   Review of patient's allergies indicates:   Allergen Reactions    Morphine     Opioids - morphine analogues          ROS:  Review of Systems   Musculoskeletal:  Positive for joint pain (Back of knee pain.).   All other systems reviewed and are negative.      OBJECTIVE:  Vital signs  Vitals:    08/17/22 1253   BP: (!) 151/89   Pulse: 86   Resp: 18   Temp: 97.7 °F (36.5 °C)   TempSrc: Oral   SpO2: 100%   Weight: 79.4 kg (175 lb)   Height: 5' 4" (1.626 m)      Body mass index is 30.04 kg/m².    PHYSICAL EXAM:   Physical Exam  Vitals and nursing note reviewed.   Constitutional:       General: She is not in acute distress.     Appearance: Normal appearance. She is not ill-appearing, toxic-appearing or diaphoretic.   HENT:      Head: Normocephalic and atraumatic.   Eyes:      Extraocular Movements: Extraocular movements intact.      Pupils: Pupils are equal, round, and reactive to light.   Cardiovascular:      Rate and Rhythm: Normal rate and regular rhythm.      Pulses: Normal pulses.      Heart sounds: Normal heart sounds.   Pulmonary:      Effort: Pulmonary effort is normal.      Breath sounds: Normal breath sounds. No wheezing or rhonchi.   Abdominal:      Palpations: Abdomen is soft.   Musculoskeletal:         General: Tenderness (Left posterior knee tenderness with palpation, generalized bilateral joint line tenderness,, varus and valgus test negative.) present. No swelling, deformity or signs of injury. Normal range of motion.      Cervical back: Normal range of motion and neck supple. No rigidity or tenderness.      Right lower leg: No edema.      Left lower leg: No edema.   Lymphadenopathy:      Cervical: No cervical adenopathy.   Skin:     General: Skin is warm.      Capillary Refill: Capillary refill takes less than 2 seconds.      Findings: No rash.   Neurological:      General: No focal deficit present.      Mental Status: She is alert and oriented to person, " place, and time. Mental status is at baseline.      Motor: No weakness.      Coordination: Coordination normal.      Gait: Gait normal.   Psychiatric:         Mood and Affect: Mood normal.         Behavior: Behavior normal.         Thought Content: Thought content normal.         Judgment: Judgment normal.        ASSESSMENT/PLAN:  1. Cervical cancer screening  -     Ambulatory referral/consult to Gynecology    2. Elevated liver enzymes  Assessment & Plan:  Blood work pending will notify patient with results when they become available.    Orders:  -     Gamma GT  -     Hepatic function panel  -     Hepatitis Panel, Acute  -     US Abdomen Limited    3. Elevated TSH  Assessment & Plan:  Blood work pending will notify of results when they become available.    Orders:  -     TSH  -     T3  -     T4    4. Left knee pain, unspecified chronicity  Assessment & Plan:  Labs discussed with patient, referral to Rheumatology has been initiated.  X-ray left knee pending will notify patient of results when they become available.  Continue medication meloxicam 50 mg p.o. as prescribed  Elevate, ice, rest.      Orders:  -     X-Ray Knee 3 View Left    5. Polyarthralgia  Overview:  Pain in b/l hands with swelling, left ankle, right knee and left hip  Endorses morning stiffness for >30 mins but worse pain at night'  Pain 8/10 at worst  Mother has RA  C/o sciatic type pain today      Assessment & Plan:  Labs discussed with patient, referral to Rheumatology has been initiated.  X-ray left knee pending will notify patient of results when they become available.  Continue medication meloxicam 50 mg p.o. as prescribed  Elevate, ice, rest.      6. Primary hypertension  Overview:  The patient presents with essential hypertension.  The patient is tolerating the medication well and is in excellent compliance.  The patient is experiencing no side effects.  Counseling was offered regarding low salt diets.  The patient has a reduced salt intake.   The patient denies chest pain, palpitations, shortness of breath, dyspnea on exertion, left or murmur neck pain, nausea, vomiting, diaphoresis, paroxysmal nocturnal dyspnea, and orthopnea.   Hypertension Medications               lisinopriL 10 MG tablet Take 20 mg by mouth once daily.    prazosin (MINIPRESS) 1 MG Cap Take by mouth 2 (two) times daily.        Has been wtihout meds for 5 months -was on lisinopril          Assessment & Plan:  Elevated blood pressure in the clinic, patient state it can be due to her nervousness and left knee pain.  Patient currently on lisinopril 30 mg.  Blood pressure log given to patient.  Patient will come back in 4 weeks for re-evaluation of her blood pressure and medication adjustment.  Continue low-salt, low-cholesterol, low-fat diet.  Exercise up to 30 minutes a day, 5 days a week as tolerated.  Stay hydrated with fluids.           RESULTS:  No results found for this or any previous visit (from the past 1008 hour(s)).  Recent Results (from the past 1344 hour(s))   Comprehensive Metabolic Panel    Collection Time: 07/05/22  8:29 AM   Result Value Ref Range    Sodium Level 140 136 - 145 mmol/L    Potassium Level 4.4 3.5 - 5.1 mmol/L    Chloride 104 98 - 107 mmol/L    Carbon Dioxide 31 (H) 22 - 29 mmol/L    Glucose Level 90 74 - 100 mg/dL    Blood Urea Nitrogen 13.2 7.0 - 18.7 mg/dL    Creatinine 0.82 0.55 - 1.02 mg/dL    Calcium Level Total 10.1 8.4 - 10.2 mg/dL    Protein Total 7.2 6.4 - 8.3 gm/dL    Albumin Level 3.8 3.5 - 5.0 gm/dL    Globulin 3.4 2.4 - 3.5 gm/dL    Albumin/Globulin Ratio 1.1 1.1 - 2.0 ratio    Bilirubin Total 0.8 <=1.5 mg/dL    Alkaline Phosphatase 83 40 - 150 unit/L    Alanine Aminotransferase 63 (H) 0 - 55 unit/L    Aspartate Aminotransferase 50 (H) 5 - 34 unit/L    Estimated GFR-Non  >60 mls/min/1.73/m2   TSH    Collection Time: 07/05/22  8:29 AM   Result Value Ref Range    Thyroid Stimulating Hormone 4.9983 (H) 0.3500 - 4.9400 uIU/mL   Lipid  Panel    Collection Time: 07/05/22  8:29 AM   Result Value Ref Range    Cholesterol Total 189 <=200 mg/dL    HDL Cholesterol 71 (H) 35 - 60 mg/dL    Triglyceride 65 37 - 140 mg/dL    Cholesterol/HDL Ratio 3 0 - 5    Very Low Density Lipoprotein 13     LDL Cholesterol 105.00 50.00 - 140.00 mg/dL   Hemoglobin A1C    Collection Time: 07/05/22  8:29 AM   Result Value Ref Range    Hemoglobin A1c 5.6 <=7.0 %    Estimated Average Glucose 114.0 mg/dL   Sedimentation rate    Collection Time: 07/05/22  8:29 AM   Result Value Ref Range    Sed Rate 8 0 - 20 mm/hr   C-reactive protein    Collection Time: 07/05/22  8:29 AM   Result Value Ref Range    C-Reactive Protein 0.70 <5.00 mg/L   Cyclic citrul peptide antibody, IgG    Collection Time: 07/05/22  8:29 AM   Result Value Ref Range    Cyclic Citrullinated Peptide Ab, IgG 16 0 - 19 Units   CHAY IgG by IFA    Collection Time: 07/05/22  8:29 AM   Result Value Ref Range    Antinuclear Ab, HEp-2 Substrate <1:80 (Negative) <1:80 (Negative)   Rheumatoid Factors, IgA, IgG, IgM    Collection Time: 07/05/22  8:29 AM   Result Value Ref Range    Rheumatoid Factor, IgA by WENDIE <5 <=6 Units    Rheumatoid Factor, IgM by WENDIE 12 (H) <=6 Units    Rheumatoid Factor, IgG by WENDIE <5 <=6 Units   CBC with Differential    Collection Time: 07/05/22  8:29 AM   Result Value Ref Range    WBC 4.7 4.5 - 11.5 x10(3)/mcL    RBC 4.53 4.20 - 5.40 x10(6)/mcL    Hgb 13.2 12.0 - 16.0 gm/dL    Hct 40.8 37.0 - 47.0 %    MCV 90.1 80.0 - 94.0 fL    MCH 29.1 27.0 - 31.0 pg    MCHC 32.4 (L) 33.0 - 36.0 mg/dL    RDW 14.1 11.5 - 17.0 %    Platelet 226 130 - 400 x10(3)/mcL    MPV 10.1 7.4 - 10.4 fL    Neut % 48.2 %    Lymph % 35.8 %    Mono % 11.2 %    Eos % 4.2 %    Basophil % 0.6 %    Lymph # 1.69 0.6 - 4.6 x10(3)/mcL    Neut # 2.3 2.1 - 9.2 x10(3)/mcL    Mono # 0.53 0.1 - 1.3 x10(3)/mcL    Eos # 0.20 0 - 0.9 x10(3)/mcL    Baso # 0.03 0 - 0.2 x10(3)/mcL    IG# 0.00 0 - 0.04 x10(3)/mcL    IG% 0.0 %    NRBC% 0.0 %   T4,  Free    Collection Time: 07/05/22  8:29 AM   Result Value Ref Range    Thyroxine Free 0.82 0.70 - 1.48 ng/dL       Follow Up:  Follow up in about 4 weeks (around 9/14/2022).      Previous medical history/lab work/radiology reviewed and considered during medical management decisions.   Medication list reviewed and medication reconciliation performed.  Patient was provided  and care about his/her current diagnosis (es) and medications including risk/benefit and side effects/adverse events, over the counter medication uses/doses, home self-care and contact precautions,  and red flags and indications for when to seek immediate medical attention.   Patient was advised to continue compliance with current medication list and medical recommendations.  Patient dvised continued compliance with recommended eating habits/ diets for medical conditions and exercise 150 minutes/ week (if possible) for medical condition (s).  Educational handouts and instructions on selected disease management in AVS (After Visit Summary).    All of the patient's questions were answered to patient's satisfaction.   The patient was receptive, expressed verbal understanding and agreement the above plan.    This note was created with the assistance of a voice recognition software or phone dictation. There may be transcription errors as a result of using this technology however minimal. Effort has been made to assure accuracy of transcription but any obvious errors or omissions should be clarified with the author of the document

## 2022-08-17 NOTE — ASSESSMENT & PLAN NOTE
Elevated blood pressure in the clinic, patient state it can be due to her nervousness and left knee pain.  Patient currently on lisinopril 30 mg.  Blood pressure log given to patient.  Patient will come back in 4 weeks for re-evaluation of her blood pressure and medication adjustment.  Continue low-salt, low-cholesterol, low-fat diet.  Exercise up to 30 minutes a day, 5 days a week as tolerated.  Stay hydrated with fluids.

## 2022-08-18 ENCOUNTER — TELEPHONE (OUTPATIENT)
Dept: FAMILY MEDICINE | Facility: CLINIC | Age: 47
End: 2022-08-18
Payer: COMMERCIAL

## 2022-08-18 LAB
HAV IGM SERPL QL IA: NONREACTIVE
HBV CORE IGM SERPL QL IA: NONREACTIVE
HBV SURFACE AG SERPL QL IA: NONREACTIVE
HCV AB SERPL QL IA: REACTIVE
PATH REV: NORMAL
PATH REV: NORMAL

## 2022-08-18 NOTE — TELEPHONE ENCOUNTER
Called patient to give results. Patient verbalized understanding. No additional questions at this time.      ----- Message from ERICA Robles sent at 8/18/2022  2:23 PM CDT -----  PLEASE CALL PATIENTS WITH RESULTS, x-ray of left knee shows mild degenerative changes she is also called osteoarthritis.  Take Advil as directed on the label as needed for pain , stay active, lose weight, elevate when needed and ice.  Instructed patient still awaiting on lab test results regarding her elevated liver functions.  Will keep her updated.

## 2022-08-18 NOTE — PROGRESS NOTES
PLEASE CALL PATIENTS WITH RESULTS, x-ray of left knee shows mild degenerative changes she is also called osteoarthritis.  Take Advil as directed on the label as needed for pain , stay active, lose weight, elevate when needed and ice.  Instructed patient still awaiting on lab test results regarding her elevated liver functions.  Will keep her updated.

## 2022-08-19 ENCOUNTER — TELEPHONE (OUTPATIENT)
Dept: FAMILY MEDICINE | Facility: CLINIC | Age: 47
End: 2022-08-19
Payer: COMMERCIAL

## 2022-08-19 PROBLEM — B19.20 HEPATITIS C VIRUS INFECTION WITHOUT HEPATIC COMA: Status: ACTIVE | Noted: 2022-08-19

## 2022-08-19 LAB — T3 SERPL IA-MCNC: 192 NG/DL (ref 80–200)

## 2022-08-19 NOTE — PROGRESS NOTES
PLEASE CALL PATIENTS WITH RESULTS, please notify patient unfortunately her test results for hepatitis-C antibody is reactive.  Also her liver enzymes continued to be elevated.  An urgent Referral to Infectious Disease has been initiated.  As we discussed in the clinic it is treatable with oral medications.  Please come back to the clinic if needed.  Take care and will see you on your next appointment.

## 2022-08-19 NOTE — TELEPHONE ENCOUNTER
----- Message from ERICA Robles sent at 8/19/2022 10:35 AM CDT -----  PLEASE CALL PATIENTS WITH RESULTS, please notify patient unfortunately her test results for hepatitis-C antibody is reactive.  Also her liver enzymes continued to be elevated.  An urgent Referral to Infectious Disease has been initiated.  As we discussed in the clinic it is treatable with oral medications.  Please come back the clinic if needed.  Take care and will see you on your next appointment.

## 2022-09-06 ENCOUNTER — TELEPHONE (OUTPATIENT)
Dept: FAMILY MEDICINE | Facility: CLINIC | Age: 47
End: 2022-09-06
Payer: COMMERCIAL

## 2022-09-06 DIAGNOSIS — R76.8 HEPATITIS C ANTIBODY POSITIVE IN BLOOD: Primary | ICD-10-CM

## 2022-09-06 NOTE — TELEPHONE ENCOUNTER
Spoke to patient regarding this information. Patient verbalized understanding. Patient states she will do labs when she does her ultrasound.

## 2022-09-06 NOTE — TELEPHONE ENCOUNTER
----- Message from ERICA Robles sent at 9/6/2022  2:48 PM CDT -----  Regarding: RE: Hep C referral VL needed  Please instructed patient she needs to go to lab for blood draw regarding her hepatitis C.  Infection disease clinic requesting detailed hepatitis C blood work.  Blood work has been ordered all she has to do is to get it done, fasting blood work not required.  Thank you  ----- Message -----  From: Niurka Hobson MA  Sent: 9/6/2022   1:56 PM CDT  To: ERICA Robles  Subject: Hep C referral VL needed                         Good Afternoon Np Kinsey,    We need a Hep C vl lab in order to process the referral. Thank you for all that you do!

## 2022-09-12 ENCOUNTER — HOSPITAL ENCOUNTER (OUTPATIENT)
Dept: RADIOLOGY | Facility: HOSPITAL | Age: 47
Discharge: HOME | End: 2022-09-12
Attending: NURSE PRACTITIONER
Payer: COMMERCIAL

## 2022-09-12 DIAGNOSIS — R74.8 ELEVATED LIVER ENZYMES: ICD-10-CM

## 2022-09-12 PROCEDURE — 76705 ECHO EXAM OF ABDOMEN: CPT | Mod: TC

## 2022-09-12 NOTE — PROGRESS NOTES
PLEASE CALL PATIENTS WITH RESULTS, ultrasound of liver shows fatty liver.  Keep follow-up appointment with infection Disease Clinic regarding hepatitis-C.  Come back to clinic as needed.

## 2022-09-13 ENCOUNTER — TELEPHONE (OUTPATIENT)
Dept: FAMILY MEDICINE | Facility: CLINIC | Age: 47
End: 2022-09-13
Payer: COMMERCIAL

## 2022-09-13 NOTE — TELEPHONE ENCOUNTER
Called patient to give results. Patient verbalized understanding. No additional questions at this time.

## 2022-09-13 NOTE — TELEPHONE ENCOUNTER
----- Message from ERICA Robles sent at 9/12/2022  3:45 PM CDT -----  PLEASE CALL PATIENTS WITH RESULTS, ultrasound of liver shows fatty liver.  Keep follow-up appointment with infection Disease Clinic regarding hepatitis-C.  Come back to clinic as needed.

## 2022-09-15 ENCOUNTER — OFFICE VISIT (OUTPATIENT)
Dept: FAMILY MEDICINE | Facility: CLINIC | Age: 47
End: 2022-09-15
Payer: COMMERCIAL

## 2022-09-15 VITALS
HEART RATE: 92 BPM | DIASTOLIC BLOOD PRESSURE: 80 MMHG | HEIGHT: 64 IN | SYSTOLIC BLOOD PRESSURE: 133 MMHG | WEIGHT: 185.19 LBS | TEMPERATURE: 98 F | RESPIRATION RATE: 18 BRPM | OXYGEN SATURATION: 98 % | BODY MASS INDEX: 31.62 KG/M2

## 2022-09-15 DIAGNOSIS — R76.8 HEPATITIS C ANTIBODY POSITIVE IN BLOOD: ICD-10-CM

## 2022-09-15 DIAGNOSIS — I10 PRIMARY HYPERTENSION: Primary | ICD-10-CM

## 2022-09-15 PROCEDURE — 1159F PR MEDICATION LIST DOCUMENTED IN MEDICAL RECORD: ICD-10-PCS | Mod: CPTII,,, | Performed by: NURSE PRACTITIONER

## 2022-09-15 PROCEDURE — 3008F PR BODY MASS INDEX (BMI) DOCUMENTED: ICD-10-PCS | Mod: CPTII,,, | Performed by: NURSE PRACTITIONER

## 2022-09-15 PROCEDURE — 99213 PR OFFICE/OUTPT VISIT, EST, LEVL III, 20-29 MIN: ICD-10-PCS | Mod: S$PBB,,, | Performed by: NURSE PRACTITIONER

## 2022-09-15 PROCEDURE — 99214 OFFICE O/P EST MOD 30 MIN: CPT | Mod: PBBFAC | Performed by: NURSE PRACTITIONER

## 2022-09-15 PROCEDURE — 99213 OFFICE O/P EST LOW 20 MIN: CPT | Mod: S$PBB,,, | Performed by: NURSE PRACTITIONER

## 2022-09-15 PROCEDURE — 3075F SYST BP GE 130 - 139MM HG: CPT | Mod: CPTII,,, | Performed by: NURSE PRACTITIONER

## 2022-09-15 PROCEDURE — 1159F MED LIST DOCD IN RCRD: CPT | Mod: CPTII,,, | Performed by: NURSE PRACTITIONER

## 2022-09-15 PROCEDURE — 87522 HEPATITIS C REVRS TRNSCRPJ: CPT | Performed by: NURSE PRACTITIONER

## 2022-09-15 PROCEDURE — 3008F BODY MASS INDEX DOCD: CPT | Mod: CPTII,,, | Performed by: NURSE PRACTITIONER

## 2022-09-15 PROCEDURE — 36415 COLL VENOUS BLD VENIPUNCTURE: CPT | Performed by: NURSE PRACTITIONER

## 2022-09-15 PROCEDURE — 3079F PR MOST RECENT DIASTOLIC BLOOD PRESSURE 80-89 MM HG: ICD-10-PCS | Mod: CPTII,,, | Performed by: NURSE PRACTITIONER

## 2022-09-15 PROCEDURE — 1160F RVW MEDS BY RX/DR IN RCRD: CPT | Mod: CPTII,,, | Performed by: NURSE PRACTITIONER

## 2022-09-15 PROCEDURE — 4010F ACE/ARB THERAPY RXD/TAKEN: CPT | Mod: CPTII,,, | Performed by: NURSE PRACTITIONER

## 2022-09-15 PROCEDURE — 3079F DIAST BP 80-89 MM HG: CPT | Mod: CPTII,,, | Performed by: NURSE PRACTITIONER

## 2022-09-15 PROCEDURE — 1160F PR REVIEW ALL MEDS BY PRESCRIBER/CLIN PHARMACIST DOCUMENTED: ICD-10-PCS | Mod: CPTII,,, | Performed by: NURSE PRACTITIONER

## 2022-09-15 PROCEDURE — 4010F PR ACE/ARB THEARPY RXD/TAKEN: ICD-10-PCS | Mod: CPTII,,, | Performed by: NURSE PRACTITIONER

## 2022-09-15 PROCEDURE — 3075F PR MOST RECENT SYSTOLIC BLOOD PRESS GE 130-139MM HG: ICD-10-PCS | Mod: CPTII,,, | Performed by: NURSE PRACTITIONER

## 2022-09-15 NOTE — PROGRESS NOTES
Patient Name: Dahiana Rivera   : 1975  MRN: [id number]     SUBJECTIVE:  Dahiana Rivera is a 46 y.o. female here for Follow-up and Knee Pain  .    46-year-old female presents to the clinic for blood pressure follow-up.  Historically patient has been out of her blood pressure medications for the past 5 months.  On 2022 patient started on lisinopril 30 mg p.o. once daily.  Patient did not bring a blood pressure logs.  Patient state her blood pressure has been hanging in systolic  in the 130s and diastolic in 80s.  Patient states she is tolerating her blood pressure lisinopril without any complications or complaints.  Instructed patient to continue low-salt diet, low-cholesterol, low-fat diet.    Patient had tested positive for hepatitis C.  Patient is here for blood draw of hep C RNA quantitative, infection Disease clinic awaiting results before excepting patient.  Patient state her significant other has hep C and has not been treated.  Instructed patient to schedule her significant other for evaluation and referral for hep C treatment, patient verbalized.    Patient reports left knee discomfort, discussed x-rays.  Instructed patient we can refer her to physical therapy as well to orthopedic clinic .  Instructed patient weekend discussed this in details or her next follow-up appointment in 3 months.  Patient states she started to exercise and hopefully that can help with pain as well she started a weight loss regiment.  Patient agreed to plan of care and verbalized understanding.    Instructed patient to call gastroenterology Clinic Dr. Blake and follow-up regarding your colonoscopy.  Patient states she had received the letter from the clinic and she has the intention to call the office and schedule.    Rheumatology referral has been initiated.  Gyn referral has been initiated from last visit for cervical cancer screening.    Come back to clinic in 3 months for follow-up.  Continue medications as  "prescribed.           Visit on 08/17/2022  46-year-old female presents to the clinic reporting of left knee pain.  Patient was seen on June of 2022 with the same issue as well with left hip pain possible sciatica.  She reports mother and father has a history of rheumatoid arthritis.  Patient stated this time the pain to the left knee is in the back of the knee and is tender to touch.  Denies any recent fall or trauma.  Patient is ambulating without assistant.  Rheumatology referral has been initiated patient awaiting appointment.  Patient is taken Mobic 15 mg once daily for polyarthralgia.     Blood pressure elevated in the clinic 151/89, patient is on lisinopril 30 mg p.o. once daily.  Patient relate the increase in her blood pressure due to her pain and to left knee and being anxious.  Blood pressure log given to patient, patient will purchase OTC blood pressure machine, patient will come back in 4 weeks for blood pressure evaluation and medication management.        ALLERGIES:   Review of patient's allergies indicates:   Allergen Reactions    Morphine     Opioids - morphine analogues          ROS:  Review of Systems   Musculoskeletal:  Positive for joint pain (chronic left knee pain).       OBJECTIVE:  Vital signs  Vitals:    09/15/22 1358   BP: 133/80   Pulse: 92   Resp: 18   Temp: 98.1 °F (36.7 °C)   SpO2: 98%   Weight: 84 kg (185 lb 3 oz)   Height: 5' 4" (1.626 m)      Body mass index is 31.79 kg/m².    PHYSICAL EXAM:   Physical Exam  Vitals and nursing note reviewed.   Constitutional:       General: She is not in acute distress.     Appearance: Normal appearance. She is obese. She is not ill-appearing, toxic-appearing or diaphoretic.   HENT:      Head: Normocephalic and atraumatic.   Eyes:      Pupils: Pupils are equal, round, and reactive to light.   Cardiovascular:      Rate and Rhythm: Normal rate and regular rhythm.      Pulses: Normal pulses.      Heart sounds: Normal heart sounds.   Abdominal:      " Palpations: Abdomen is soft.      Tenderness: There is no abdominal tenderness.   Musculoskeletal:         General: Normal range of motion.      Cervical back: Normal range of motion.   Skin:     General: Skin is warm.      Capillary Refill: Capillary refill takes less than 2 seconds.      Findings: No rash.   Neurological:      General: No focal deficit present.      Mental Status: She is alert and oriented to person, place, and time. Mental status is at baseline.      Gait: Gait normal.   Psychiatric:         Mood and Affect: Mood normal.         Behavior: Behavior normal.         Thought Content: Thought content normal.         Judgment: Judgment normal.        ASSESSMENT/PLAN:  1. Primary hypertension  Overview:  The patient presents with essential hypertension.  The patient is tolerating the medication well and is in excellent compliance.  The patient is experiencing no side effects.  Counseling was offered regarding low salt diets.  The patient has a reduced salt intake.  The patient denies chest pain, palpitations, shortness of breath, dyspnea on exertion, left or murmur neck pain, nausea, vomiting, diaphoresis, paroxysmal nocturnal dyspnea, and orthopnea.   Hypertension Medications               lisinopriL 10 MG tablet Take 20 mg by mouth once daily.    prazosin (MINIPRESS) 1 MG Cap Take by mouth 2 (two) times daily.        Has been wtout meds for 5 months -was on lisinopril          Assessment & Plan:  Patient did not bring blood pressure log.  Instructed patient to bring log next visit.  Continue lisinopril 30 mg p.o. once daily, call for refills as needed.  Blood pressure today 133/80.  Follow low-salt, low-cholesterol, low-fat diet.  Stay active, exercise up to 30 minutes a day as tolerated, 5 days a week.  Stay hydrated with fluids specially water.  Stop smoking.  If you change of mind return refer you to smoke cessation, patient verbalized.  Eat fresh fruits and vegetables.  Return to clinic sooner  if needed.  Keep follow-up appointment in 3 months.      2. Hepatitis C antibody positive in blood  Assessment & Plan:  Hep C viral RNA-quantitative real-time PCR with reflux  has been drawn in the clinic.  Blood work pending.  Infection disease clinic awaiting results to schedule patient.  Practice safe sex.  Recommended for her partner to get tested and treated if needed.   Questions solicited and answered.  Patient verbalized.  Return in 3 months as discussed.    Orders:  -     Hepatitis C Viral(HCV) RNA, Quant Real-Time PCR w/Reflexs  -     Hepatitis C Virus Quantitative  -     Hepatitis C Virus Quantitative         RESULTS:  Recent Results (from the past 1008 hour(s))   Gamma GT    Collection Time: 08/17/22  1:21 PM   Result Value Ref Range    Gamma Glutamyl Transferase 51 (H) 9 - 36 U/L   Hepatic function panel    Collection Time: 08/17/22  1:21 PM   Result Value Ref Range    Albumin Level 4.0 3.5 - 5.0 gm/dL    Alkaline Phosphatase 117 40 - 150 unit/L    Alanine Aminotransferase 66 (H) 0 - 55 unit/L    Aspartate Aminotransferase 44 (H) 5 - 34 unit/L    Bilirubin Direct 0.2 0.0 - 0.5 mg/dL    Bilirubin Indirect 0.20 0.00 - 0.80 mg/dL    Bilirubin Total 0.4 <=1.5 mg/dL    Protein Total 7.3 6.4 - 8.3 gm/dL   TSH    Collection Time: 08/17/22  1:21 PM   Result Value Ref Range    Thyroid Stimulating Hormone 2.0782 0.3500 - 4.9400 uIU/mL   T3    Collection Time: 08/17/22  1:21 PM   Result Value Ref Range    T3 (Triiodothyronine), Total, S 192 80 - 200 ng/dL   Hepatitis Panel, Acute    Collection Time: 08/17/22  1:21 PM   Result Value Ref Range    Hepatitis A IgM Nonreactive Nonreactive    Hepatitis B Core IgM Nonreactive Nonreactive    Hepatitis B Surface Antigen Nonreactive Nonreactive    Hepatitis C Antibody Reactive (A) Nonreactive   T4 (In-House)    Collection Time: 08/17/22  1:21 PM   Result Value Ref Range    Thyroxine 8.78 4.87 - 11.72 ug/dL   Pathologist Interpretation    Collection Time: 08/17/22  1:21 PM    Result Value Ref Range    Pathology Review       Chemical abnormalities suggesting hepatic parenchymal injury; no specific evidence of biliary disease.    Pallavi Nair MD     Pathologist Interpretation    Collection Time: 08/17/22  1:21 PM   Result Value Ref Range    Pathology Review       The presence of hepatitis C antibody indicates past or present hepatitis C infection.    Pallavi Nair MD       X-Ray Knee 3 View Left    Result Date: 8/17/2022  EXAMINATION: Left knee three views CLINICAL HISTORY: Knee pain COMPARISON: None FINDINGS: No acute fracture subluxation.  There are mild degenerative changes of the medial compartment of the knee.  No erosions seen.  No joint effusion.     No acute osseous finding.  Mild degenerative changes of the medial compartment of the knee. Electronically signed by: Jona Shelton MD Date:    08/17/2022 Time:    15:06    US Abdomen Limited    Result Date: 9/12/2022  EXAMINATION: US ABDOMEN LIMITED CLINICAL HISTORY: elevated liver functions.;, Abnormal levels of other serum enzymes. TECHNIQUE: Transverse and longitudinal images of the right upper abdomen were obtained. COMPARISON: None FINDINGS: Liver: Size: 15.8 cm in the right midclavicular line, normal Appearance: There is some increased echogenicity of the liver parenchyma increased echogenicity decreases the sensitivity to detect focal lesions Mass: No focal masses Gallbladder: Stones/Sludge: None Appearance: No wall thickening, pericholecystic fluid or hydrops Sonographic Gao's Sign: Negative Bile Ducts: Intrahepatic Ducts: No dilatation Extrahepatic Ducts: Common bile duct measures 0.65 cm, no dilatation Pancreas: Pancreas is obscured by gas Right Kidney: Size: 10.3 cm in length Echogenicity: Normal Collecting System: No hydronephrosis Stone: None Cyst/Mass: None Vessels: Inferior Vena Cava: Visualized portions are normal. Main Portal Vein: Patent with hepatopedal  flow. Free Fluid: No ascites or pleural  effusions     Changes of hepatic steatosis with no other abnormality seen Electronically signed by: Dhaval Ny Date:    09/12/2022 Time:    10:27    Mammo Digital Screening Bilat w/ Guero    Result Date: 8/18/2022   - MAMMO DIGITAL SCREENING BILAT WITH GUERO BILATERAL DIGITAL SCREENING MAMMOGRAM 3D/2D WITH CAD: 8/17/2022 HISTORY: 46-year-old woman presents for screening mammogram.  COMPARISONS: No prior exams were available for comparison.  TECHNIQUE: Digital mammography views were performed with tomosynthesis. Current study was evaluated with a Computer Aided Detection (CAD) system. BREAST COMPOSITION: The breasts are heterogeneously dense, which may obscure small masses.  FINDINGS: No suspicious mass, asymmetry, distortion, or calcification is identified. IMPRESSION: NEGATIVE Right Breast: Negative (BI-RADS 1) Left Breast: Negative (BI-RADS 1) Recommendations: Recommend continued annual screening mammography (with Digital Breast Tomosynthesis), according to American College of Radiology guidelines. Elpidio Alicia M.D.          lm/:8/18/2022 07:45:15  letter sent: Mammography Normal  Mammogram BI-RADS: 1 Negative      Follow Up:  Follow up in about 3 months (around 12/15/2022).      Previous medical history/lab work/radiology reviewed and considered during medical management decisions.   Medication list reviewed and medication reconciliation performed.  Patient was provided  and care about his/her current diagnosis (es) and medications including risk/benefit and side effects/adverse events, over the counter medication uses/doses, home self-care and contact precautions,  and red flags and indications for when to seek immediate medical attention.   Patient was advised to continue compliance with current medication list and medical recommendations.  Patient dvised continued compliance with recommended eating habits/ diets for medical conditions and exercise 150 minutes/ week (if possible) for medical  condition (s).  Educational handouts and instructions on selected disease management in AVS (After Visit Summary).    All of the patient's questions were answered to patient's satisfaction.   The patient was receptive, expressed verbal understanding and agreement the above plan.         This note was created with the assistance of a voice recognition software or phone dictation. There may be transcription errors as a result of using this technology however minimal. Effort has been made to assure accuracy of transcription but any obvious errors or omissions should be clarified with the author of the document

## 2022-09-15 NOTE — ASSESSMENT & PLAN NOTE
Patient did not bring blood pressure log.  Instructed patient to bring log next visit.  Continue lisinopril 30 mg p.o. once daily, call for refills as needed.  Blood pressure today 133/80.  Follow low-salt, low-cholesterol, low-fat diet.  Stay active, exercise up to 30 minutes a day as tolerated, 5 days a week.  Stay hydrated with fluids specially water.  Stop smoking.  If you change of mind return refer you to smoke cessation, patient verbalized.  Eat fresh fruits and vegetables.  Return to clinic sooner if needed.  Keep follow-up appointment in 3 months.

## 2022-09-15 NOTE — ASSESSMENT & PLAN NOTE
Hep C viral RNA-quantitative real-time PCR with reflux  has been drawn in the clinic.  Blood work pending.  Infection disease clinic awaiting results to schedule patient.  Practice safe sex.  Recommended for her partner to get tested and treated if needed.   Questions solicited and answered.  Patient verbalized.  Return in 3 months as discussed.

## 2022-09-17 LAB — HCV RNA SERPL NAA+PROBE-ACNC: ABNORMAL IU/ML

## 2022-09-18 NOTE — PROGRESS NOTES
PLEASE CALL PATIENTS WITH RESULTS, hep C infection, instruct patient she should expect a call from infection Disease Clinic for hep C evaluation and treatment.  Keep follow-up appointment as discussed, return to clinic as needed.

## 2022-09-19 ENCOUNTER — TELEPHONE (OUTPATIENT)
Dept: FAMILY MEDICINE | Facility: CLINIC | Age: 47
End: 2022-09-19
Payer: COMMERCIAL

## 2022-09-19 NOTE — TELEPHONE ENCOUNTER
----- Message from ERICA Robles sent at 9/18/2022  5:17 PM CDT -----  PLEASE CALL PATIENTS WITH RESULTS, hep C infection, instruct patient she should expect a call from infection Disease Clinic for hep C evaluation and treatment.  Keep follow-up appointment as discussed, return to clinic as needed.

## 2022-10-03 DIAGNOSIS — B18.2 CHRONIC HEPATITIS C WITHOUT HEPATIC COMA: Primary | ICD-10-CM

## 2022-10-03 PROBLEM — Z13.1 SCREENING FOR DIABETES MELLITUS: Status: RESOLVED | Noted: 2022-06-29 | Resolved: 2022-10-03

## 2022-10-11 ENCOUNTER — TELEPHONE (OUTPATIENT)
Dept: INFECTIOUS DISEASES | Facility: CLINIC | Age: 47
End: 2022-10-11
Payer: COMMERCIAL

## 2022-10-11 NOTE — TELEPHONE ENCOUNTER
----- Message from Lenora Villalba sent at 10/11/2022  8:18 AM CDT -----  Regarding: Ultrasound  #GISSELL/NADINE    Pt was contacted by Central Scheduling to schedule an ultrasound ordered by Nadine. Pt had an ultrasound completed 9/12/22, and is concerned her insurance will not pay for another one. Please contact pt @ [phone] with further instruction.

## 2022-10-11 NOTE — TELEPHONE ENCOUNTER
No need for another abdominal u/s as pt recently had on on 9/12/2022. I called and explained this to pt. Abdominal u/s. ordered placed on 10/3/2022 discontinued. Pt verbalizes understanding

## 2022-11-03 ENCOUNTER — OFFICE VISIT (OUTPATIENT)
Dept: INFECTIOUS DISEASES | Facility: CLINIC | Age: 47
End: 2022-11-03
Payer: COMMERCIAL

## 2022-11-03 ENCOUNTER — PROCEDURE VISIT (OUTPATIENT)
Dept: INFECTIOUS DISEASES | Facility: CLINIC | Age: 47
End: 2022-11-03
Payer: COMMERCIAL

## 2022-11-03 VITALS
OXYGEN SATURATION: 98 % | HEART RATE: 121 BPM | WEIGHT: 178 LBS | DIASTOLIC BLOOD PRESSURE: 80 MMHG | SYSTOLIC BLOOD PRESSURE: 143 MMHG | RESPIRATION RATE: 20 BRPM | HEIGHT: 64 IN | BODY MASS INDEX: 30.39 KG/M2 | TEMPERATURE: 99 F

## 2022-11-03 DIAGNOSIS — B18.2 CHRONIC HEPATITIS C WITHOUT HEPATIC COMA: Primary | ICD-10-CM

## 2022-11-03 DIAGNOSIS — B18.2 CHRONIC HEPATITIS C WITHOUT HEPATIC COMA: ICD-10-CM

## 2022-11-03 DIAGNOSIS — B19.20 HEPATITIS C VIRUS INFECTION WITHOUT HEPATIC COMA, UNSPECIFIED CHRONICITY: ICD-10-CM

## 2022-11-03 LAB
ALBUMIN SERPL-MCNC: 4 GM/DL (ref 3.5–5)
ALBUMIN/GLOB SERPL: 1.1 RATIO (ref 1.1–2)
ALP SERPL-CCNC: 93 UNIT/L (ref 40–150)
ALT SERPL-CCNC: 51 UNIT/L (ref 0–55)
AST SERPL-CCNC: 45 UNIT/L (ref 5–34)
BILIRUBIN DIRECT+TOT PNL SERPL-MCNC: 0.7 MG/DL
BUN SERPL-MCNC: 10.9 MG/DL (ref 7–18.7)
CALCIUM SERPL-MCNC: 9.3 MG/DL (ref 8.4–10.2)
CHLORIDE SERPL-SCNC: 102 MMOL/L (ref 98–107)
CO2 SERPL-SCNC: 29 MMOL/L (ref 22–29)
CREAT SERPL-MCNC: 0.81 MG/DL (ref 0.55–1.02)
GFR SERPLBLD CREATININE-BSD FMLA CKD-EPI: >60 MLS/MIN/1.73/M2
GLOBULIN SER-MCNC: 3.7 GM/DL (ref 2.4–3.5)
GLUCOSE SERPL-MCNC: 95 MG/DL (ref 74–100)
POTASSIUM SERPL-SCNC: 4.6 MMOL/L (ref 3.5–5.1)
PROT SERPL-MCNC: 7.7 GM/DL (ref 6.4–8.3)
SODIUM SERPL-SCNC: 138 MMOL/L (ref 136–145)

## 2022-11-03 PROCEDURE — 1159F MED LIST DOCD IN RCRD: CPT | Mod: CPTII,,, | Performed by: NURSE PRACTITIONER

## 2022-11-03 PROCEDURE — 91200 LIVER ELASTOGRAPHY: CPT | Mod: PBBFAC | Performed by: INTERNAL MEDICINE

## 2022-11-03 PROCEDURE — 36415 COLL VENOUS BLD VENIPUNCTURE: CPT | Performed by: NURSE PRACTITIONER

## 2022-11-03 PROCEDURE — 4010F PR ACE/ARB THEARPY RXD/TAKEN: ICD-10-PCS | Mod: CPTII,,, | Performed by: NURSE PRACTITIONER

## 2022-11-03 PROCEDURE — 4010F ACE/ARB THERAPY RXD/TAKEN: CPT | Mod: CPTII,,, | Performed by: NURSE PRACTITIONER

## 2022-11-03 PROCEDURE — 3008F PR BODY MASS INDEX (BMI) DOCUMENTED: ICD-10-PCS | Mod: CPTII,,, | Performed by: NURSE PRACTITIONER

## 2022-11-03 PROCEDURE — 3079F DIAST BP 80-89 MM HG: CPT | Mod: CPTII,,, | Performed by: NURSE PRACTITIONER

## 2022-11-03 PROCEDURE — 99214 OFFICE O/P EST MOD 30 MIN: CPT | Mod: PBBFAC | Performed by: NURSE PRACTITIONER

## 2022-11-03 PROCEDURE — 99214 PR OFFICE/OUTPT VISIT, EST, LEVL IV, 30-39 MIN: ICD-10-PCS | Mod: S$PBB,,, | Performed by: NURSE PRACTITIONER

## 2022-11-03 PROCEDURE — 3008F BODY MASS INDEX DOCD: CPT | Mod: CPTII,,, | Performed by: NURSE PRACTITIONER

## 2022-11-03 PROCEDURE — 1159F PR MEDICATION LIST DOCUMENTED IN MEDICAL RECORD: ICD-10-PCS | Mod: CPTII,,, | Performed by: NURSE PRACTITIONER

## 2022-11-03 PROCEDURE — 3077F SYST BP >= 140 MM HG: CPT | Mod: CPTII,,, | Performed by: NURSE PRACTITIONER

## 2022-11-03 PROCEDURE — 3077F PR MOST RECENT SYSTOLIC BLOOD PRESSURE >= 140 MM HG: ICD-10-PCS | Mod: CPTII,,, | Performed by: NURSE PRACTITIONER

## 2022-11-03 PROCEDURE — 3079F PR MOST RECENT DIASTOLIC BLOOD PRESSURE 80-89 MM HG: ICD-10-PCS | Mod: CPTII,,, | Performed by: NURSE PRACTITIONER

## 2022-11-03 PROCEDURE — 1160F RVW MEDS BY RX/DR IN RCRD: CPT | Mod: CPTII,,, | Performed by: NURSE PRACTITIONER

## 2022-11-03 PROCEDURE — 99214 OFFICE O/P EST MOD 30 MIN: CPT | Mod: S$PBB,,, | Performed by: NURSE PRACTITIONER

## 2022-11-03 PROCEDURE — 1160F PR REVIEW ALL MEDS BY PRESCRIBER/CLIN PHARMACIST DOCUMENTED: ICD-10-PCS | Mod: CPTII,,, | Performed by: NURSE PRACTITIONER

## 2022-11-03 PROCEDURE — 80053 COMPREHEN METABOLIC PANEL: CPT | Performed by: NURSE PRACTITIONER

## 2022-11-03 NOTE — PROGRESS NOTES
Patient here for FibroScan visit. Reports NPO X3 hours and denies any implanted electronic devices. Pt denies being pregnant. Position patient for the procedure to expose the right rib cage. Explained procedure to the patient. FibroScan exam complete and was tolerated without any problems.
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
## 2022-11-07 ENCOUNTER — CLINICAL SUPPORT (OUTPATIENT)
Dept: INFECTIOUS DISEASES | Facility: CLINIC | Age: 47
End: 2022-11-07
Payer: COMMERCIAL

## 2022-11-07 DIAGNOSIS — B19.20 HEPATITIS C VIRUS INFECTION WITHOUT HEPATIC COMA, UNSPECIFIED CHRONICITY: ICD-10-CM

## 2022-11-07 LAB
A2 MACROGLOB SERPL-MCNC: 288 MG/DL (ref 100–280)
ALBUMIN SERPL-MCNC: 4.1 GM/DL (ref 3.5–5)
ALBUMIN/GLOB SERPL: 1.2 RATIO (ref 1.1–2)
ALP SERPL-CCNC: 113 UNIT/L (ref 40–150)
ALT SERPL W P-5'-P-CCNC: 56 U/L (ref 7–45)
ALT SERPL-CCNC: 54 UNIT/L (ref 0–55)
ANNOTATION COMMENT IMP: ABNORMAL
APO A-I SERPL-MCNC: 188 MG/DL
AST SERPL-CCNC: 43 UNIT/L (ref 5–34)
BASOPHILS # BLD AUTO: 0.03 X10(3)/MCL (ref 0–0.2)
BASOPHILS NFR BLD AUTO: 0.6 %
BILIRUB SERPL-MCNC: 0.5 MG/DL
BILIRUBIN DIRECT+TOT PNL SERPL-MCNC: 0.7 MG/DL
BUN SERPL-MCNC: 13.6 MG/DL (ref 7–18.7)
CALCIUM SERPL-MCNC: 9.7 MG/DL (ref 8.4–10.2)
CHLORIDE SERPL-SCNC: 100 MMOL/L (ref 98–107)
CO2 SERPL-SCNC: 31 MMOL/L (ref 22–29)
CREAT SERPL-MCNC: 0.97 MG/DL (ref 0.55–1.02)
EOSINOPHIL # BLD AUTO: 0.19 X10(3)/MCL (ref 0–0.9)
EOSINOPHIL NFR BLD AUTO: 3.6 %
ERYTHROCYTE [DISTWIDTH] IN BLOOD BY AUTOMATED COUNT: 12.9 % (ref 11.5–17)
FERRITIN SERPL-MCNC: 46.15 NG/ML (ref 4.63–204)
FIBROSIS STAGE SERPL QL: ABNORMAL
GFR SERPLBLD CREATININE-BSD FMLA CKD-EPI: >60 MLS/MIN/1.73/M2
GGT SERPL-CCNC: 39 U/L (ref 5–36)
GLOBULIN SER-MCNC: 3.5 GM/DL (ref 2.4–3.5)
GLUCOSE SERPL-MCNC: 68 MG/DL (ref 74–100)
HAPTOGLOB SERPL NEPH-MCNC: 71 MG/DL (ref 30–200)
HAV AB SER QL IA: NONREACTIVE
HBV CORE AB SERPL QL IA: NONREACTIVE
HBV SURFACE AB SER-ACNC: 3.93 MIU/ML
HBV SURFACE AB SERPL IA-ACNC: NONREACTIVE M[IU]/ML
HBV SURFACE AG SERPL QL IA: NONREACTIVE
HCT VFR BLD AUTO: 42.2 % (ref 37–47)
HGB BLD-MCNC: 13.5 GM/DL (ref 12–16)
HIV 1+2 AB+HIV1 P24 AG SERPL QL IA: NONREACTIVE
IMM GRANULOCYTES # BLD AUTO: 0.02 X10(3)/MCL (ref 0–0.04)
IMM GRANULOCYTES NFR BLD AUTO: 0.4 %
IRON SATN MFR SERPL: 33 % (ref 20–50)
IRON SERPL-MCNC: 122 UG/DL (ref 50–170)
LIVER FIBR SCORE SERPL CALC.FIBROSURE: 0.28
LIVER FIBROSIS INTERPRETATION SER-IMP: ABNORMAL
LYMPHOCYTES # BLD AUTO: 1.53 X10(3)/MCL (ref 0.6–4.6)
LYMPHOCYTES NFR BLD AUTO: 28.9 %
MCH RBC QN AUTO: 29.7 PG (ref 27–31)
MCHC RBC AUTO-ENTMCNC: 32 MG/DL (ref 33–36)
MCV RBC AUTO: 93 FL (ref 80–94)
MONOCYTES # BLD AUTO: 0.46 X10(3)/MCL (ref 0.1–1.3)
MONOCYTES NFR BLD AUTO: 8.7 %
NECROINFLAMMATORY ACT GRADE SERPL QL: ABNORMAL
NECROINFLAMMATORY ACT SCORE SERPL: 0.32
NECROINFLAMMATORY ACTIV INTERP SER-IMP: ABNORMAL
NEUTROPHILS # BLD AUTO: 3.1 X10(3)/MCL (ref 2.1–9.2)
NEUTROPHILS NFR BLD AUTO: 57.8 %
NRBC BLD AUTO-RTO: 0 %
PLATELET # BLD AUTO: 215 X10(3)/MCL (ref 130–400)
PMV BLD AUTO: 9.8 FL (ref 7.4–10.4)
POTASSIUM SERPL-SCNC: 4.8 MMOL/L (ref 3.5–5.1)
PROT SERPL-MCNC: 7.6 GM/DL (ref 6.4–8.3)
RBC # BLD AUTO: 4.54 X10(6)/MCL (ref 4.2–5.4)
SERIAL #: ABNORMAL
SODIUM SERPL-SCNC: 138 MMOL/L (ref 136–145)
TIBC SERPL-MCNC: 252 UG/DL (ref 70–310)
TIBC SERPL-MCNC: 374 UG/DL (ref 250–450)
TRANSFERRIN SERPL-MCNC: 326 MG/DL (ref 180–382)
TSH SERPL-ACNC: 2.75 UIU/ML (ref 0.35–4.94)
WBC # SPEC AUTO: 5.3 X10(3)/MCL (ref 4.5–11.5)

## 2022-11-07 PROCEDURE — 80053 COMPREHEN METABOLIC PANEL: CPT

## 2022-11-07 PROCEDURE — 87902 NFCT AGT GNTYP ALYS HEP C: CPT

## 2022-11-07 PROCEDURE — 87389 HIV-1 AG W/HIV-1&-2 AB AG IA: CPT

## 2022-11-07 PROCEDURE — 84443 ASSAY THYROID STIM HORMONE: CPT

## 2022-11-07 PROCEDURE — 87340 HEPATITIS B SURFACE AG IA: CPT

## 2022-11-07 PROCEDURE — 81596 NFCT DS CHRNC HCV 6 ASSAYS: CPT | Performed by: NURSE PRACTITIONER

## 2022-11-07 PROCEDURE — 82728 ASSAY OF FERRITIN: CPT

## 2022-11-07 PROCEDURE — 86706 HEP B SURFACE ANTIBODY: CPT

## 2022-11-07 PROCEDURE — 85610 PROTHROMBIN TIME: CPT

## 2022-11-07 PROCEDURE — 83540 ASSAY OF IRON: CPT

## 2022-11-07 PROCEDURE — 81596 NFCT DS CHRNC HCV 6 ASSAYS: CPT

## 2022-11-07 PROCEDURE — 86704 HEP B CORE ANTIBODY TOTAL: CPT

## 2022-11-07 PROCEDURE — 85025 COMPLETE CBC W/AUTO DIFF WBC: CPT

## 2022-11-07 PROCEDURE — 86708 HEPATITIS A ANTIBODY: CPT

## 2022-11-07 PROCEDURE — 36415 COLL VENOUS BLD VENIPUNCTURE: CPT

## 2022-11-09 LAB — HCV GENTYP SERPL NAA+PROBE: ABNORMAL

## 2022-11-11 LAB
A2 MACROGLOB SERPL-MCNC: 310 MG/DL (ref 100–280)
ALT SERPL W P-5'-P-CCNC: 60 U/L (ref 7–45)
ANNOTATION COMMENT IMP: ABNORMAL
APO A-I SERPL-MCNC: 208 MG/DL
BILIRUB SERPL-MCNC: 0.6 MG/DL
FIBROSIS STAGE SERPL QL: ABNORMAL
GGT SERPL-CCNC: 40 U/L (ref 5–36)
HAPTOGLOB SERPL NEPH-MCNC: 71 MG/DL (ref 30–200)
LIVER FIBR SCORE SERPL CALC.FIBROSURE: 0.29
LIVER FIBROSIS INTERPRETATION SER-IMP: ABNORMAL
NECROINFLAMMATORY ACT GRADE SERPL QL: ABNORMAL
NECROINFLAMMATORY ACT SCORE SERPL: 0.35
NECROINFLAMMATORY ACTIV INTERP SER-IMP: ABNORMAL
SERIAL #: ABNORMAL

## 2022-11-29 ENCOUNTER — OFFICE VISIT (OUTPATIENT)
Dept: INFECTIOUS DISEASES | Facility: CLINIC | Age: 47
End: 2022-11-29
Payer: COMMERCIAL

## 2022-11-29 VITALS
HEIGHT: 64 IN | DIASTOLIC BLOOD PRESSURE: 87 MMHG | TEMPERATURE: 98 F | BODY MASS INDEX: 31.56 KG/M2 | HEART RATE: 93 BPM | RESPIRATION RATE: 14 BRPM | SYSTOLIC BLOOD PRESSURE: 135 MMHG | WEIGHT: 184.88 LBS

## 2022-11-29 DIAGNOSIS — Z23 NEED FOR VACCINATION: ICD-10-CM

## 2022-11-29 DIAGNOSIS — B18.2 CHRONIC HEPATITIS C WITHOUT HEPATIC COMA: Primary | ICD-10-CM

## 2022-11-29 PROCEDURE — 99214 PR OFFICE/OUTPT VISIT, EST, LEVL IV, 30-39 MIN: ICD-10-PCS | Mod: S$PBB,,, | Performed by: NURSE PRACTITIONER

## 2022-11-29 PROCEDURE — 3008F BODY MASS INDEX DOCD: CPT | Mod: CPTII,,, | Performed by: NURSE PRACTITIONER

## 2022-11-29 PROCEDURE — 90636 HEP A/HEP B VACC ADULT IM: CPT | Mod: PBBFAC

## 2022-11-29 PROCEDURE — 3075F SYST BP GE 130 - 139MM HG: CPT | Mod: CPTII,,, | Performed by: NURSE PRACTITIONER

## 2022-11-29 PROCEDURE — 3079F PR MOST RECENT DIASTOLIC BLOOD PRESSURE 80-89 MM HG: ICD-10-PCS | Mod: CPTII,,, | Performed by: NURSE PRACTITIONER

## 2022-11-29 PROCEDURE — 99214 OFFICE O/P EST MOD 30 MIN: CPT | Mod: PBBFAC | Performed by: NURSE PRACTITIONER

## 2022-11-29 PROCEDURE — 3075F PR MOST RECENT SYSTOLIC BLOOD PRESS GE 130-139MM HG: ICD-10-PCS | Mod: CPTII,,, | Performed by: NURSE PRACTITIONER

## 2022-11-29 PROCEDURE — 1159F MED LIST DOCD IN RCRD: CPT | Mod: CPTII,,, | Performed by: NURSE PRACTITIONER

## 2022-11-29 PROCEDURE — 1160F PR REVIEW ALL MEDS BY PRESCRIBER/CLIN PHARMACIST DOCUMENTED: ICD-10-PCS | Mod: CPTII,,, | Performed by: NURSE PRACTITIONER

## 2022-11-29 PROCEDURE — 3008F PR BODY MASS INDEX (BMI) DOCUMENTED: ICD-10-PCS | Mod: CPTII,,, | Performed by: NURSE PRACTITIONER

## 2022-11-29 PROCEDURE — 1160F RVW MEDS BY RX/DR IN RCRD: CPT | Mod: CPTII,,, | Performed by: NURSE PRACTITIONER

## 2022-11-29 PROCEDURE — 4010F ACE/ARB THERAPY RXD/TAKEN: CPT | Mod: CPTII,,, | Performed by: NURSE PRACTITIONER

## 2022-11-29 PROCEDURE — 3079F DIAST BP 80-89 MM HG: CPT | Mod: CPTII,,, | Performed by: NURSE PRACTITIONER

## 2022-11-29 PROCEDURE — 1159F PR MEDICATION LIST DOCUMENTED IN MEDICAL RECORD: ICD-10-PCS | Mod: CPTII,,, | Performed by: NURSE PRACTITIONER

## 2022-11-29 PROCEDURE — 99214 OFFICE O/P EST MOD 30 MIN: CPT | Mod: S$PBB,,, | Performed by: NURSE PRACTITIONER

## 2022-11-29 PROCEDURE — 4010F PR ACE/ARB THEARPY RXD/TAKEN: ICD-10-PCS | Mod: CPTII,,, | Performed by: NURSE PRACTITIONER

## 2022-11-29 RX ORDER — VELPATASVIR AND SOFOSBUVIR 100; 400 MG/1; MG/1
1 TABLET, FILM COATED ORAL DAILY
Qty: 84 TABLET | Refills: 0 | Status: SHIPPED | OUTPATIENT
Start: 2022-11-29 | End: 2023-03-14
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
## 2022-12-09 ENCOUNTER — CLINICAL SUPPORT (OUTPATIENT)
Dept: INFECTIOUS DISEASES | Facility: CLINIC | Age: 47
End: 2022-12-09
Payer: COMMERCIAL

## 2022-12-09 ENCOUNTER — TELEPHONE (OUTPATIENT)
Dept: INFECTIOUS DISEASES | Facility: CLINIC | Age: 47
End: 2022-12-09
Payer: COMMERCIAL

## 2022-12-09 DIAGNOSIS — B18.2 CHRONIC HEPATITIS C WITHOUT HEPATIC COMA: Primary | ICD-10-CM

## 2022-12-09 PROCEDURE — 99211 OFF/OP EST MAY X REQ PHY/QHP: CPT | Mod: PBBFAC

## 2022-12-09 NOTE — TELEPHONE ENCOUNTER
----- Message from Jenelle Corbin RN sent at 12/8/2022  1:25 PM CST -----  Regarding: FW: Med mgmt approval letter    ----- Message -----  From: Elina Norwood  Sent: 12/8/2022  12:23 PM CST  To: , #  Subject: Med mgmt approval letter                         LANDON WESTON           Pt received a letter from [facility] stating that she has been approved for Epclusa. She wanted to inform the nurse.   Pt # [phone]

## 2022-12-09 NOTE — TELEPHONE ENCOUNTER
Pt to come in today for Epclusa Teaching with cm2 @1400.  Pt to go by pharmacy ([facility]) and  med and pay copay of 9.85, then come to office to  lab dates.

## 2023-01-06 ENCOUNTER — LAB VISIT (OUTPATIENT)
Dept: LAB | Facility: HOSPITAL | Age: 48
End: 2023-01-06
Attending: NURSE PRACTITIONER
Payer: COMMERCIAL

## 2023-01-06 DIAGNOSIS — B18.2 CHRONIC HEPATITIS C WITHOUT HEPATIC COMA: ICD-10-CM

## 2023-01-06 LAB
ALBUMIN SERPL-MCNC: 3.9 G/DL (ref 3.5–5)
ALBUMIN/GLOB SERPL: 1 RATIO (ref 1.1–2)
ALP SERPL-CCNC: 90 UNIT/L (ref 40–150)
ALT SERPL-CCNC: 19 UNIT/L (ref 0–55)
AST SERPL-CCNC: 24 UNIT/L (ref 5–34)
BASOPHILS # BLD AUTO: 0.03 X10(3)/MCL (ref 0–0.2)
BASOPHILS NFR BLD AUTO: 0.5 %
BILIRUBIN DIRECT+TOT PNL SERPL-MCNC: 0.7 MG/DL
BUN SERPL-MCNC: 12.5 MG/DL (ref 7–18.7)
CALCIUM SERPL-MCNC: 9.5 MG/DL (ref 8.4–10.2)
CHLORIDE SERPL-SCNC: 105 MMOL/L (ref 98–107)
CO2 SERPL-SCNC: 27 MMOL/L (ref 22–29)
CREAT SERPL-MCNC: 0.94 MG/DL (ref 0.55–1.02)
EOSINOPHIL # BLD AUTO: 0.19 X10(3)/MCL (ref 0–0.9)
EOSINOPHIL NFR BLD AUTO: 3.4 %
ERYTHROCYTE [DISTWIDTH] IN BLOOD BY AUTOMATED COUNT: 12.5 % (ref 11–14.5)
GFR SERPLBLD CREATININE-BSD FMLA CKD-EPI: 75 MLS/MIN/1.73/M2
GLOBULIN SER-MCNC: 3.8 GM/DL (ref 2.4–3.5)
GLUCOSE SERPL-MCNC: 87 MG/DL (ref 74–100)
HCT VFR BLD AUTO: 39.8 % (ref 37–47)
HGB BLD-MCNC: 13.1 GM/DL (ref 12–16)
IMM GRANULOCYTES # BLD AUTO: 0.01 X10(3)/MCL (ref 0–0.04)
IMM GRANULOCYTES NFR BLD AUTO: 0.2 %
LYMPHOCYTES # BLD AUTO: 1.87 X10(3)/MCL (ref 0.6–4.6)
LYMPHOCYTES NFR BLD AUTO: 33 %
MCH RBC QN AUTO: 29.3 PG
MCHC RBC AUTO-ENTMCNC: 32.9 MG/DL (ref 33–36)
MCV RBC AUTO: 89 FL (ref 80–94)
MONOCYTES # BLD AUTO: 0.59 X10(3)/MCL (ref 0.1–1.3)
MONOCYTES NFR BLD AUTO: 10.4 %
NEUTROPHILS # BLD AUTO: 2.98 X10(3)/MCL (ref 2.1–9.2)
NEUTROPHILS NFR BLD AUTO: 52.5 %
NRBC BLD AUTO-RTO: 0 % (ref 0–1)
PLATELET # BLD AUTO: 220 X10(3)/MCL (ref 140–371)
PMV BLD AUTO: 9.4 FL (ref 9.4–12.4)
POTASSIUM SERPL-SCNC: 4.5 MMOL/L (ref 3.5–5.1)
PROT SERPL-MCNC: 7.7 GM/DL (ref 6.4–8.3)
RBC # BLD AUTO: 4.47 X10(6)/MCL (ref 4.2–5.4)
SODIUM SERPL-SCNC: 141 MMOL/L (ref 136–145)
WBC # SPEC AUTO: 5.7 X10(3)/MCL (ref 4.5–11.5)

## 2023-01-06 PROCEDURE — 87522 HEPATITIS C REVRS TRNSCRPJ: CPT

## 2023-01-06 PROCEDURE — 85025 COMPLETE CBC W/AUTO DIFF WBC: CPT

## 2023-01-06 PROCEDURE — 80053 COMPREHEN METABOLIC PANEL: CPT

## 2023-01-06 PROCEDURE — 36415 COLL VENOUS BLD VENIPUNCTURE: CPT

## 2023-01-11 ENCOUNTER — OFFICE VISIT (OUTPATIENT)
Dept: FAMILY MEDICINE | Facility: CLINIC | Age: 48
End: 2023-01-11
Payer: COMMERCIAL

## 2023-01-11 VITALS
WEIGHT: 190 LBS | RESPIRATION RATE: 18 BRPM | OXYGEN SATURATION: 98 % | SYSTOLIC BLOOD PRESSURE: 186 MMHG | HEART RATE: 93 BPM | DIASTOLIC BLOOD PRESSURE: 106 MMHG | HEIGHT: 64 IN | TEMPERATURE: 98 F | BODY MASS INDEX: 32.44 KG/M2

## 2023-01-11 DIAGNOSIS — B18.2 CHRONIC HEPATITIS C WITHOUT HEPATIC COMA: ICD-10-CM

## 2023-01-11 DIAGNOSIS — I10 PRIMARY HYPERTENSION: Primary | ICD-10-CM

## 2023-01-11 DIAGNOSIS — Z72.0 TOBACCO USER: ICD-10-CM

## 2023-01-11 LAB — HCV RNA SERPL NAA+PROBE-ACNC: <15 IU/ML

## 2023-01-11 PROCEDURE — 1160F RVW MEDS BY RX/DR IN RCRD: CPT | Mod: CPTII,,, | Performed by: NURSE PRACTITIONER

## 2023-01-11 PROCEDURE — 3077F PR MOST RECENT SYSTOLIC BLOOD PRESSURE >= 140 MM HG: ICD-10-PCS | Mod: CPTII,,, | Performed by: NURSE PRACTITIONER

## 2023-01-11 PROCEDURE — 4010F PR ACE/ARB THEARPY RXD/TAKEN: ICD-10-PCS | Mod: CPTII,,, | Performed by: NURSE PRACTITIONER

## 2023-01-11 PROCEDURE — 99213 PR OFFICE/OUTPT VISIT, EST, LEVL III, 20-29 MIN: ICD-10-PCS | Mod: S$PBB,,, | Performed by: NURSE PRACTITIONER

## 2023-01-11 PROCEDURE — 3077F SYST BP >= 140 MM HG: CPT | Mod: CPTII,,, | Performed by: NURSE PRACTITIONER

## 2023-01-11 PROCEDURE — 3008F BODY MASS INDEX DOCD: CPT | Mod: CPTII,,, | Performed by: NURSE PRACTITIONER

## 2023-01-11 PROCEDURE — 3008F PR BODY MASS INDEX (BMI) DOCUMENTED: ICD-10-PCS | Mod: CPTII,,, | Performed by: NURSE PRACTITIONER

## 2023-01-11 PROCEDURE — 4010F ACE/ARB THERAPY RXD/TAKEN: CPT | Mod: CPTII,,, | Performed by: NURSE PRACTITIONER

## 2023-01-11 PROCEDURE — 1159F MED LIST DOCD IN RCRD: CPT | Mod: CPTII,,, | Performed by: NURSE PRACTITIONER

## 2023-01-11 PROCEDURE — 3080F DIAST BP >= 90 MM HG: CPT | Mod: CPTII,,, | Performed by: NURSE PRACTITIONER

## 2023-01-11 PROCEDURE — 1159F PR MEDICATION LIST DOCUMENTED IN MEDICAL RECORD: ICD-10-PCS | Mod: CPTII,,, | Performed by: NURSE PRACTITIONER

## 2023-01-11 PROCEDURE — 99213 OFFICE O/P EST LOW 20 MIN: CPT | Mod: S$PBB,,, | Performed by: NURSE PRACTITIONER

## 2023-01-11 PROCEDURE — 99215 OFFICE O/P EST HI 40 MIN: CPT | Mod: PBBFAC | Performed by: NURSE PRACTITIONER

## 2023-01-11 PROCEDURE — 3080F PR MOST RECENT DIASTOLIC BLOOD PRESSURE >= 90 MM HG: ICD-10-PCS | Mod: CPTII,,, | Performed by: NURSE PRACTITIONER

## 2023-01-11 PROCEDURE — 1160F PR REVIEW ALL MEDS BY PRESCRIBER/CLIN PHARMACIST DOCUMENTED: ICD-10-PCS | Mod: CPTII,,, | Performed by: NURSE PRACTITIONER

## 2023-01-11 RX ORDER — HYDROCHLOROTHIAZIDE 12.5 MG/1
12.5 TABLET ORAL DAILY
Qty: 90 TABLET | Refills: 3 | Status: SHIPPED | OUTPATIENT
Start: 2023-01-11 | End: 2024-01-18 | Stop reason: SDUPTHER

## 2023-01-11 RX ORDER — LISINOPRIL 40 MG/1
40 TABLET ORAL DAILY
Qty: 90 TABLET | Refills: 3 | Status: SHIPPED | OUTPATIENT
Start: 2023-01-11 | End: 2024-01-18 | Stop reason: SDUPTHER

## 2023-01-11 NOTE — ASSESSMENT & PLAN NOTE
Continue treatment as prescribed.  Keep follow-up appointment with infectious disease clinic.   Per Dr. Leggett:    Please let patient know - no significant arrhythmia, does have premature ventricular complexes, which are symptomatic.  Patient is already being seen by cardiology, she can follow up with cardiology department for any further work up.

## 2023-01-11 NOTE — PROGRESS NOTES
Patient Name: Dahiana Rivera   : 1975  MRN: [id number]     SUBJECTIVE DATA:    CHIEF COMPLAINT:   Dahiana Rivera is a 47 y.o. female who presents to clinic today with Leg Swelling (JANE)        HPI:   HPI  46-year-old female presents to the clinic for blood pressure follow-up.      Hypertension:  Current blood pressure initially 175/99, repeated 186/106, denies any chest pain or short of breath or dizziness or headache or blurry vision. On 2022 patient started on lisinopril 30 mg p.o. once daily.  Patient did not bring a blood pressure logs.  Patient state her blood pressure has been elevated and now she having exacerbated bilateral lower extremity edema.  Denies any lower extremity pain or discomfort. Patient state she stands on her feet all day. Patient states she is tolerating her blood pressure lisinopril without any complications or complaints.  Discussed with patient will increase lisinopril to 40 mg p.o. once daily and will add hydrochlorothiazide 12.5 mg p.o. once daily.  Recent labs reviewed at bedside with patient, creatinine 0.94 and BUN 0.94 from blood work done on 2023.   Patient to return to clinic in 2 weeks for blood pressure check, bring blood pressure log, return to clinic sooner if needed.  Elevate lower extremity when possible.       Infection disease:  Patient currently being managed for hep C infection.  Patient states she is tolerating treatment well without any complications.     Patient declined immunization at this visit.  Reminded patient she is an appointment coming up with gyn for her cervical cancer screening and women Health on 2023.  Patient verbalized.     Instructed patient to call gastroenterology Clinic Dr. Blake and follow-up regarding your colonoscopy.  Patient states she had received the letter from the clinic and she has the intention to call the office and schedule.     Patient denies chest pain, shortness of breath, dyspnea on exertion,  "palpitations, peripheral edema, abdominal pain, nausea, vomiting, diarrhea, constipation, fatigue, fever, chills, dysuria,  hematuria, melena, or hematochezia.     ALLERGIES:   Review of patient's allergies indicates:   Allergen Reactions    Morphine     Opioids - morphine analogues          ROS:  Review of Systems   All other systems reviewed and are negative.      OBJECTIVE DATA:  Vital signs  Vitals:    01/11/23 1212 01/11/23 1240   BP: (!) 175/99 (!) 186/106   Pulse: 93    Resp: 18    Temp: 98 °F (36.7 °C)    TempSrc: Oral    SpO2: 98%    Weight: 86.2 kg (190 lb)    Height: 5' 4" (1.626 m)       Body mass index is 32.61 kg/m².    PHYSICAL EXAM:   Physical Exam  Vitals and nursing note reviewed.   Constitutional:       General: She is awake. She is not in acute distress.     Appearance: Normal appearance. She is well-developed and well-groomed. She is not ill-appearing, toxic-appearing or diaphoretic.   HENT:      Head: Normocephalic and atraumatic.      Right Ear: Tympanic membrane, ear canal and external ear normal.      Left Ear: Tympanic membrane, ear canal and external ear normal.      Nose: Nose normal.      Mouth/Throat:      Mouth: Mucous membranes are moist.      Pharynx: Oropharynx is clear.   Eyes:      General: Lids are normal.      Extraocular Movements: Extraocular movements intact.      Conjunctiva/sclera: Conjunctivae normal.      Pupils: Pupils are equal, round, and reactive to light.   Cardiovascular:      Rate and Rhythm: Normal rate and regular rhythm.      Pulses: Normal pulses.      Heart sounds: Normal heart sounds.   Pulmonary:      Effort: Pulmonary effort is normal.      Breath sounds: Normal breath sounds.   Abdominal:      General: Bowel sounds are normal.      Palpations: Abdomen is soft.   Musculoskeletal:         General: Normal range of motion.      Cervical back: Normal range of motion and neck supple.   Skin:     General: Skin is warm and dry.      Capillary Refill: Capillary " refill takes less than 2 seconds.   Neurological:      General: No focal deficit present.      Mental Status: She is alert and oriented to person, place, and time. Mental status is at baseline.   Psychiatric:         Mood and Affect: Mood normal.         Behavior: Behavior normal. Behavior is cooperative.         Thought Content: Thought content normal.         Judgment: Judgment normal.        ASSESSMENT/PLAN:  1. Primary hypertension  Overview:  The patient presents with essential hypertension.  The patient is tolerating the medication well and is in excellent compliance.  The patient is experiencing no side effects.  Counseling was offered regarding low salt diets.  The patient has a reduced salt intake.  The patient denies chest pain, palpitations, shortness of breath, dyspnea on exertion, left or murmur neck pain, nausea, vomiting, diaphoresis, paroxysmal nocturnal dyspnea, and orthopnea.   Hypertension Medications               lisinopriL 10 MG tablet Take 20 mg by mouth once daily.    prazosin (MINIPRESS) 1 MG Cap Take by mouth 2 (two) times daily.        Has been wtihout meds for 5 months -was on lisinopril          Assessment & Plan:  Patient did not bring blood pressure log.  Instructed patient to bring log next visit.  Rx lisinopril 40 mg with hydrochlorothiazide 12.5 mg p.o. once daily, call for refills as needed.  Blood pressure uncontrolled 175/99.  Return to clinic in 2 weeks or sooner if blood pressure not corrected with medications.   Follow low-salt, low-cholesterol, low-fat diet.  Stay active, exercise up to 30 minutes a day as tolerated, 5 days a week.  Stay hydrated with fluids specially water.  Stop smoking.  If you change of mind return refer you to smoke cessation, patient verbalized.  Eat fresh fruits and vegetables.  Return to clinic sooner if needed.  Keep follow-up appointment in 3 months.  Portion solicited and answered, patient verbalized.    Orders:  -     lisinopriL (PRINIVIL,ZESTRIL)  40 MG tablet; Take 1 tablet (40 mg total) by mouth once daily.  Dispense: 90 tablet; Refill: 3  -     hydroCHLOROthiazide (HYDRODIURIL) 12.5 MG Tab; Take 1 tablet (12.5 mg total) by mouth once daily.  Dispense: 90 tablet; Refill: 3    2. Tobacco user  Overview:  1/2 ppd x 20 yrs with 1 yr cessation    Assessment & Plan:  Declined smoke cessation counseling.      3. Chronic hepatitis C without hepatic coma  Assessment & Plan:  Continue treatment as prescribed.  Keep follow-up appointment with infectious disease clinic.             RESULTS:  Recent Results (from the past 1008 hour(s))   Comprehensive Metabolic Panel    Collection Time: 01/06/23  3:08 PM   Result Value Ref Range    Sodium Level 141 136 - 145 mmol/L    Potassium Level 4.5 3.5 - 5.1 mmol/L    Chloride 105 98 - 107 mmol/L    Carbon Dioxide 27 22 - 29 mmol/L    Glucose Level 87 74 - 100 mg/dL    Blood Urea Nitrogen 12.5 7.0 - 18.7 mg/dL    Creatinine 0.94 0.55 - 1.02 mg/dL    Calcium Level Total 9.5 8.4 - 10.2 mg/dL    Protein Total 7.7 6.4 - 8.3 gm/dL    Albumin Level 3.9 3.5 - 5.0 g/dL    Globulin 3.8 (H) 2.4 - 3.5 gm/dL    Albumin/Globulin Ratio 1.0 (L) 1.1 - 2.0 ratio    Bilirubin Total 0.7 <=1.5 mg/dL    Alkaline Phosphatase 90 40 - 150 unit/L    Alanine Aminotransferase 19 0 - 55 unit/L    Aspartate Aminotransferase 24 5 - 34 unit/L    eGFR 75 mls/min/1.73/m2   Hepatitis C Virus Quantitative    Collection Time: 01/06/23  3:08 PM   Result Value Ref Range    HCV RNA Detect/Quant <15 (A) Undetected IU/mL   CBC with Differential    Collection Time: 01/06/23  3:08 PM   Result Value Ref Range    WBC 5.7 4.5 - 11.5 x10(3)/mcL    RBC 4.47 4.20 - 5.40 x10(6)/mcL    Hgb 13.1 12.0 - 16.0 gm/dL    Hct 39.8 37.0 - 47.0 %    MCV 89.0 80.0 - 94.0 fL    MCH 29.3 pg    MCHC 32.9 (L) 33.0 - 36.0 mg/dL    RDW 12.5 11.0 - 14.5 %    Platelet 220 140 - 371 x10(3)/mcL    MPV 9.4 9.4 - 12.4 fL    Neut % 52.5 %    Lymph % 33.0 %    Mono % 10.4 %    Eos % 3.4 %    Basophil %  0.5 %    Lymph # 1.87 0.6 - 4.6 x10(3)/mcL    Neut # 2.98 2.1 - 9.2 x10(3)/mcL    Mono # 0.59 0.1 - 1.3 x10(3)/mcL    Eos # 0.19 0 - 0.9 x10(3)/mcL    Baso # 0.03 0 - 0.2 x10(3)/mcL    IG# 0.01 0 - 0.04 x10(3)/mcL    IG% 0.2 %    NRBC% 0.0 0 - 1 %         Follow Up:  Follow up in about 3 months (around 4/11/2023).      Previous medical history/lab work/radiology reviewed and considered during medical management decisions.   Medication list reviewed and medication reconciliation performed.  Patient was provided  and care about his/her current diagnosis (es) and medications including risk/benefit and side effects/adverse events, over the counter medication uses/doses, home self-care and contact precautions,  and red flags and indications for when to seek immediate medical attention.   Patient was advised to continue compliance with current medication list and medical recommendations.  Patient dvised continued compliance with recommended eating habits/ diets for medical conditions and exercise 150 minutes/ week (if possible) for medical condition (s).  Educational handouts and instructions on selected disease management in AVS (After Visit Summary).    All of the patient's questions were answered to patient's satisfaction.   The patient was receptive, expressed verbal understanding and agreement the above plan.           This note was created with the assistance of a voice recognition software or phone dictation. There may be transcription errors as a result of using this technology however minimal. Effort has been made to assure accuracy of transcription but any obvious errors or omissions should be clarified with the author of the document

## 2023-02-03 ENCOUNTER — LAB VISIT (OUTPATIENT)
Dept: LAB | Facility: HOSPITAL | Age: 48
End: 2023-02-03
Attending: NURSE PRACTITIONER
Payer: COMMERCIAL

## 2023-02-03 DIAGNOSIS — B18.2 CHRONIC HEPATITIS C WITHOUT HEPATIC COMA: ICD-10-CM

## 2023-02-03 LAB
ALBUMIN SERPL-MCNC: 4.4 G/DL (ref 3.5–5)
ALBUMIN/GLOB SERPL: 1.2 RATIO (ref 1.1–2)
ALP SERPL-CCNC: 87 UNIT/L (ref 40–150)
ALT SERPL-CCNC: 22 UNIT/L (ref 0–55)
AST SERPL-CCNC: 27 UNIT/L (ref 5–34)
BASOPHILS # BLD AUTO: 0.04 X10(3)/MCL (ref 0–0.2)
BASOPHILS NFR BLD AUTO: 0.5 %
BILIRUBIN DIRECT+TOT PNL SERPL-MCNC: 0.9 MG/DL
BUN SERPL-MCNC: 29.2 MG/DL (ref 7–18.7)
CALCIUM SERPL-MCNC: 10.1 MG/DL (ref 8.4–10.2)
CHLORIDE SERPL-SCNC: 98 MMOL/L (ref 98–107)
CO2 SERPL-SCNC: 29 MMOL/L (ref 22–29)
CREAT SERPL-MCNC: 1.29 MG/DL (ref 0.55–1.02)
EOSINOPHIL # BLD AUTO: 0.23 X10(3)/MCL (ref 0–0.9)
EOSINOPHIL NFR BLD AUTO: 2.9 %
ERYTHROCYTE [DISTWIDTH] IN BLOOD BY AUTOMATED COUNT: 12 % (ref 11.5–17)
GFR SERPLBLD CREATININE-BSD FMLA CKD-EPI: 52 MLS/MIN/1.73/M2
GLOBULIN SER-MCNC: 3.8 GM/DL (ref 2.4–3.5)
GLUCOSE SERPL-MCNC: 100 MG/DL (ref 74–100)
HCT VFR BLD AUTO: 41.9 % (ref 37–47)
HGB BLD-MCNC: 13.9 GM/DL (ref 12–16)
IMM GRANULOCYTES # BLD AUTO: 0.01 X10(3)/MCL (ref 0–0.04)
IMM GRANULOCYTES NFR BLD AUTO: 0.1 %
LYMPHOCYTES # BLD AUTO: 3.13 X10(3)/MCL (ref 0.6–4.6)
LYMPHOCYTES NFR BLD AUTO: 39.2 %
MCH RBC QN AUTO: 29.2 PG
MCHC RBC AUTO-ENTMCNC: 33.2 MG/DL (ref 33–36)
MCV RBC AUTO: 88 FL (ref 80–94)
MONOCYTES # BLD AUTO: 0.82 X10(3)/MCL (ref 0.1–1.3)
MONOCYTES NFR BLD AUTO: 10.3 %
NEUTROPHILS # BLD AUTO: 3.75 X10(3)/MCL (ref 2.1–9.2)
NEUTROPHILS NFR BLD AUTO: 47 %
NRBC BLD AUTO-RTO: 0 %
PLATELET # BLD AUTO: 221 X10(3)/MCL (ref 130–400)
PMV BLD AUTO: 10.2 FL (ref 7.4–10.4)
POTASSIUM SERPL-SCNC: 4.7 MMOL/L (ref 3.5–5.1)
PROT SERPL-MCNC: 8.2 GM/DL (ref 6.4–8.3)
RBC # BLD AUTO: 4.76 X10(6)/MCL (ref 4.2–5.4)
SODIUM SERPL-SCNC: 137 MMOL/L (ref 136–145)
WBC # SPEC AUTO: 8 X10(3)/MCL (ref 4.5–11.5)

## 2023-02-03 PROCEDURE — 36415 COLL VENOUS BLD VENIPUNCTURE: CPT

## 2023-02-03 PROCEDURE — 80053 COMPREHEN METABOLIC PANEL: CPT

## 2023-02-03 PROCEDURE — 85025 COMPLETE CBC W/AUTO DIFF WBC: CPT

## 2023-02-06 ENCOUNTER — OFFICE VISIT (OUTPATIENT)
Dept: INFECTIOUS DISEASES | Facility: CLINIC | Age: 48
End: 2023-02-06
Payer: COMMERCIAL

## 2023-02-06 VITALS
SYSTOLIC BLOOD PRESSURE: 127 MMHG | TEMPERATURE: 98 F | HEART RATE: 96 BPM | BODY MASS INDEX: 30.8 KG/M2 | DIASTOLIC BLOOD PRESSURE: 83 MMHG | WEIGHT: 180.38 LBS | HEIGHT: 64 IN

## 2023-02-06 DIAGNOSIS — B18.2 CHRONIC HEPATITIS C WITHOUT HEPATIC COMA: Primary | ICD-10-CM

## 2023-02-06 DIAGNOSIS — Z23 NEED FOR VACCINATION: ICD-10-CM

## 2023-02-06 PROCEDURE — 99214 OFFICE O/P EST MOD 30 MIN: CPT | Mod: PBBFAC | Performed by: NURSE PRACTITIONER

## 2023-02-06 PROCEDURE — 99214 PR OFFICE/OUTPT VISIT, EST, LEVL IV, 30-39 MIN: ICD-10-PCS | Mod: S$PBB,,, | Performed by: NURSE PRACTITIONER

## 2023-02-06 PROCEDURE — 4010F PR ACE/ARB THEARPY RXD/TAKEN: ICD-10-PCS | Mod: CPTII,,, | Performed by: NURSE PRACTITIONER

## 2023-02-06 PROCEDURE — 3008F BODY MASS INDEX DOCD: CPT | Mod: CPTII,,, | Performed by: NURSE PRACTITIONER

## 2023-02-06 PROCEDURE — 3079F DIAST BP 80-89 MM HG: CPT | Mod: CPTII,,, | Performed by: NURSE PRACTITIONER

## 2023-02-06 PROCEDURE — 3008F PR BODY MASS INDEX (BMI) DOCUMENTED: ICD-10-PCS | Mod: CPTII,,, | Performed by: NURSE PRACTITIONER

## 2023-02-06 PROCEDURE — 4010F ACE/ARB THERAPY RXD/TAKEN: CPT | Mod: CPTII,,, | Performed by: NURSE PRACTITIONER

## 2023-02-06 PROCEDURE — 99214 OFFICE O/P EST MOD 30 MIN: CPT | Mod: S$PBB,,, | Performed by: NURSE PRACTITIONER

## 2023-02-06 PROCEDURE — 1160F RVW MEDS BY RX/DR IN RCRD: CPT | Mod: CPTII,,, | Performed by: NURSE PRACTITIONER

## 2023-02-06 PROCEDURE — 3074F SYST BP LT 130 MM HG: CPT | Mod: CPTII,,, | Performed by: NURSE PRACTITIONER

## 2023-02-06 PROCEDURE — 1159F PR MEDICATION LIST DOCUMENTED IN MEDICAL RECORD: ICD-10-PCS | Mod: CPTII,,, | Performed by: NURSE PRACTITIONER

## 2023-02-06 PROCEDURE — 3074F PR MOST RECENT SYSTOLIC BLOOD PRESSURE < 130 MM HG: ICD-10-PCS | Mod: CPTII,,, | Performed by: NURSE PRACTITIONER

## 2023-02-06 PROCEDURE — 87522 HEPATITIS C REVRS TRNSCRPJ: CPT | Performed by: NURSE PRACTITIONER

## 2023-02-06 PROCEDURE — 90636 HEP A/HEP B VACC ADULT IM: CPT | Mod: PBBFAC

## 2023-02-06 PROCEDURE — 36415 COLL VENOUS BLD VENIPUNCTURE: CPT | Performed by: NURSE PRACTITIONER

## 2023-02-06 PROCEDURE — 3079F PR MOST RECENT DIASTOLIC BLOOD PRESSURE 80-89 MM HG: ICD-10-PCS | Mod: CPTII,,, | Performed by: NURSE PRACTITIONER

## 2023-02-06 PROCEDURE — 90471 IMMUNIZATION ADMIN: CPT | Mod: PBBFAC

## 2023-02-06 PROCEDURE — 1160F PR REVIEW ALL MEDS BY PRESCRIBER/CLIN PHARMACIST DOCUMENTED: ICD-10-PCS | Mod: CPTII,,, | Performed by: NURSE PRACTITIONER

## 2023-02-06 PROCEDURE — 1159F MED LIST DOCD IN RCRD: CPT | Mod: CPTII,,, | Performed by: NURSE PRACTITIONER

## 2023-02-06 RX ORDER — LISINOPRIL 30 MG/1
30 TABLET ORAL
COMMUNITY
Start: 2023-01-11 | End: 2023-03-14
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
## 2023-02-07 LAB — HCV RNA SERPL NAA+PROBE-ACNC: NORMAL IU/ML

## 2023-02-13 ENCOUNTER — PATIENT MESSAGE (OUTPATIENT)
Dept: ADMINISTRATIVE | Facility: HOSPITAL | Age: 48
End: 2023-02-13
Payer: COMMERCIAL

## 2023-02-20 NOTE — PROCEDURES
Fibroscan Procedure     Name: Dahiana Rivera  Date of Procedure : 2022  Interpreting Physician: Simi Julien MD, MPH  Diagnosis: HCV    Probe: M    Fibroscan readin.0 kPa    Fibrosis: F2     CAP readin dB/m    Steatosis: S1      Miscellaneous:

## 2023-03-03 ENCOUNTER — LAB VISIT (OUTPATIENT)
Dept: LAB | Facility: HOSPITAL | Age: 48
End: 2023-03-03
Attending: NURSE PRACTITIONER
Payer: COMMERCIAL

## 2023-03-03 DIAGNOSIS — B18.2 CHRONIC HEPATITIS C WITHOUT HEPATIC COMA: ICD-10-CM

## 2023-03-03 LAB
ALBUMIN SERPL-MCNC: 4 G/DL (ref 3.5–5)
ALBUMIN/GLOB SERPL: 1.1 RATIO (ref 1.1–2)
ALP SERPL-CCNC: 69 UNIT/L (ref 40–150)
ALT SERPL-CCNC: 17 UNIT/L (ref 0–55)
APPEARANCE UR: CLEAR
AST SERPL-CCNC: 21 UNIT/L (ref 5–34)
BACTERIA #/AREA URNS AUTO: ABNORMAL /HPF
BASOPHILS # BLD AUTO: 0.03 X10(3)/MCL (ref 0–0.2)
BASOPHILS NFR BLD AUTO: 0.5 %
BILIRUB UR QL STRIP.AUTO: NEGATIVE MG/DL
BILIRUBIN DIRECT+TOT PNL SERPL-MCNC: 0.7 MG/DL
BUN SERPL-MCNC: 16.5 MG/DL (ref 7–18.7)
CALCIUM SERPL-MCNC: 9.9 MG/DL (ref 8.4–10.2)
CHLORIDE SERPL-SCNC: 99 MMOL/L (ref 98–107)
CO2 SERPL-SCNC: 30 MMOL/L (ref 22–29)
COLOR UR AUTO: ABNORMAL
CREAT SERPL-MCNC: 1.05 MG/DL (ref 0.55–1.02)
EOSINOPHIL # BLD AUTO: 0.12 X10(3)/MCL (ref 0–0.9)
EOSINOPHIL NFR BLD AUTO: 2 %
ERYTHROCYTE [DISTWIDTH] IN BLOOD BY AUTOMATED COUNT: 13 % (ref 11.5–17)
GFR SERPLBLD CREATININE-BSD FMLA CKD-EPI: >60 MLS/MIN/1.73/M2
GLOBULIN SER-MCNC: 3.6 GM/DL (ref 2.4–3.5)
GLUCOSE SERPL-MCNC: 163 MG/DL (ref 74–100)
GLUCOSE UR QL STRIP.AUTO: NORMAL MG/DL
HCT VFR BLD AUTO: 41.7 % (ref 37–47)
HGB BLD-MCNC: 13.5 G/DL (ref 12–16)
HYALINE CASTS #/AREA URNS LPF: ABNORMAL /LPF
IMM GRANULOCYTES # BLD AUTO: 0.02 X10(3)/MCL (ref 0–0.04)
IMM GRANULOCYTES NFR BLD AUTO: 0.3 %
KETONES UR QL STRIP.AUTO: NEGATIVE MG/DL
LEUKOCYTE ESTERASE UR QL STRIP.AUTO: NEGATIVE UNIT/L
LYMPHOCYTES # BLD AUTO: 1.71 X10(3)/MCL (ref 0.6–4.6)
LYMPHOCYTES NFR BLD AUTO: 28.2 %
MCH RBC QN AUTO: 29.3 PG
MCHC RBC AUTO-ENTMCNC: 32.4 G/DL (ref 33–36)
MCV RBC AUTO: 90.5 FL (ref 80–94)
MONOCYTES # BLD AUTO: 0.45 X10(3)/MCL (ref 0.1–1.3)
MONOCYTES NFR BLD AUTO: 7.4 %
MUCOUS THREADS URNS QL MICRO: ABNORMAL /LPF
NEUTROPHILS # BLD AUTO: 3.74 X10(3)/MCL (ref 2.1–9.2)
NEUTROPHILS NFR BLD AUTO: 61.6 %
NITRITE UR QL STRIP.AUTO: NEGATIVE
NRBC BLD AUTO-RTO: 0 %
PH UR STRIP.AUTO: 7 [PH]
PLATELET # BLD AUTO: 227 X10(3)/MCL (ref 130–400)
PMV BLD AUTO: 9.1 FL (ref 7.4–10.4)
POTASSIUM SERPL-SCNC: 4.2 MMOL/L (ref 3.5–5.1)
PROT SERPL-MCNC: 7.6 GM/DL (ref 6.4–8.3)
PROT UR QL STRIP.AUTO: NEGATIVE MG/DL
RBC # BLD AUTO: 4.61 X10(6)/MCL (ref 4.2–5.4)
RBC #/AREA URNS AUTO: ABNORMAL /HPF
RBC UR QL AUTO: NEGATIVE UNIT/L
SODIUM SERPL-SCNC: 135 MMOL/L (ref 136–145)
SP GR UR STRIP.AUTO: 1.03
SQUAMOUS #/AREA URNS LPF: ABNORMAL /HPF
UROBILINOGEN UR STRIP-ACNC: NORMAL MG/DL
WBC # SPEC AUTO: 6.1 X10(3)/MCL (ref 4.5–11.5)
WBC #/AREA URNS AUTO: ABNORMAL /HPF

## 2023-03-03 PROCEDURE — 85025 COMPLETE CBC W/AUTO DIFF WBC: CPT

## 2023-03-03 PROCEDURE — 80053 COMPREHEN METABOLIC PANEL: CPT

## 2023-03-03 PROCEDURE — 36415 COLL VENOUS BLD VENIPUNCTURE: CPT

## 2023-03-03 PROCEDURE — 87522 HEPATITIS C REVRS TRNSCRPJ: CPT

## 2023-03-07 LAB — HCV RNA SERPL NAA+PROBE-ACNC: NORMAL IU/ML

## 2023-03-14 ENCOUNTER — OFFICE VISIT (OUTPATIENT)
Dept: INFECTIOUS DISEASES | Facility: CLINIC | Age: 48
End: 2023-03-14
Payer: COMMERCIAL

## 2023-03-14 DIAGNOSIS — B18.2 CHRONIC HEPATITIS C WITHOUT HEPATIC COMA: Primary | ICD-10-CM

## 2023-03-14 DIAGNOSIS — Z23 NEED FOR VACCINATION: ICD-10-CM

## 2023-03-14 PROCEDURE — 99214 PR OFFICE/OUTPT VISIT, EST, LEVL IV, 30-39 MIN: ICD-10-PCS | Mod: 95,,, | Performed by: NURSE PRACTITIONER

## 2023-03-14 PROCEDURE — 1159F PR MEDICATION LIST DOCUMENTED IN MEDICAL RECORD: ICD-10-PCS | Mod: CPTII,95,, | Performed by: NURSE PRACTITIONER

## 2023-03-14 PROCEDURE — 4010F ACE/ARB THERAPY RXD/TAKEN: CPT | Mod: CPTII,95,, | Performed by: NURSE PRACTITIONER

## 2023-03-14 PROCEDURE — 1159F MED LIST DOCD IN RCRD: CPT | Mod: CPTII,95,, | Performed by: NURSE PRACTITIONER

## 2023-03-14 PROCEDURE — 4010F PR ACE/ARB THEARPY RXD/TAKEN: ICD-10-PCS | Mod: CPTII,95,, | Performed by: NURSE PRACTITIONER

## 2023-03-14 PROCEDURE — 99214 OFFICE O/P EST MOD 30 MIN: CPT | Mod: 95,,, | Performed by: NURSE PRACTITIONER

## 2023-03-14 RX ORDER — BUPROPION HYDROCHLORIDE 300 MG/1
300 TABLET ORAL EVERY MORNING
COMMUNITY
Start: 2023-03-07
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
## 2023-05-30 ENCOUNTER — PATIENT MESSAGE (OUTPATIENT)
Dept: ADMINISTRATIVE | Facility: HOSPITAL | Age: 48
End: 2023-05-30
Payer: COMMERCIAL

## 2023-06-07 ENCOUNTER — TELEPHONE (OUTPATIENT)
Dept: INFECTIOUS DISEASES | Facility: CLINIC | Age: 48
End: 2023-06-07
Payer: COMMERCIAL

## 2023-06-07 NOTE — TELEPHONE ENCOUNTER
Tried to call patient to schedule her to come in for her Twinrix #3, but no answer.  Pt was supposed to come in on 6/1/2023 for SVR appt and Twinrix, but pt cancelled.

## 2023-07-10 ENCOUNTER — PATIENT MESSAGE (OUTPATIENT)
Dept: ADMINISTRATIVE | Facility: HOSPITAL | Age: 48
End: 2023-07-10
Payer: COMMERCIAL

## 2023-07-10 ENCOUNTER — LAB VISIT (OUTPATIENT)
Dept: LAB | Facility: HOSPITAL | Age: 48
End: 2023-07-10
Attending: NURSE PRACTITIONER
Payer: COMMERCIAL

## 2023-07-10 DIAGNOSIS — B18.2 CHRONIC HEPATITIS C WITHOUT HEPATIC COMA: ICD-10-CM

## 2023-07-10 PROCEDURE — 36415 COLL VENOUS BLD VENIPUNCTURE: CPT

## 2023-07-10 PROCEDURE — 87522 HEPATITIS C REVRS TRNSCRPJ: CPT

## 2023-07-11 ENCOUNTER — OFFICE VISIT (OUTPATIENT)
Dept: INFECTIOUS DISEASES | Facility: CLINIC | Age: 48
End: 2023-07-11
Payer: COMMERCIAL

## 2023-07-11 DIAGNOSIS — B18.2 CHRONIC HEPATITIS C WITHOUT HEPATIC COMA: Primary | ICD-10-CM

## 2023-07-11 DIAGNOSIS — Z23 NEED FOR VACCINATION: ICD-10-CM

## 2023-07-11 PROCEDURE — 1160F PR REVIEW ALL MEDS BY PRESCRIBER/CLIN PHARMACIST DOCUMENTED: ICD-10-PCS | Mod: CPTII,95,, | Performed by: NURSE PRACTITIONER

## 2023-07-11 PROCEDURE — 99214 OFFICE O/P EST MOD 30 MIN: CPT | Mod: 95,,, | Performed by: NURSE PRACTITIONER

## 2023-07-11 PROCEDURE — 1159F MED LIST DOCD IN RCRD: CPT | Mod: CPTII,95,, | Performed by: NURSE PRACTITIONER

## 2023-07-11 PROCEDURE — 1159F PR MEDICATION LIST DOCUMENTED IN MEDICAL RECORD: ICD-10-PCS | Mod: CPTII,95,, | Performed by: NURSE PRACTITIONER

## 2023-07-11 PROCEDURE — 4010F ACE/ARB THERAPY RXD/TAKEN: CPT | Mod: CPTII,95,, | Performed by: NURSE PRACTITIONER

## 2023-07-11 PROCEDURE — 1160F RVW MEDS BY RX/DR IN RCRD: CPT | Mod: CPTII,95,, | Performed by: NURSE PRACTITIONER

## 2023-07-11 PROCEDURE — 99214 PR OFFICE/OUTPT VISIT, EST, LEVL IV, 30-39 MIN: ICD-10-PCS | Mod: 95,,, | Performed by: NURSE PRACTITIONER

## 2023-07-11 PROCEDURE — 4010F PR ACE/ARB THEARPY RXD/TAKEN: ICD-10-PCS | Mod: CPTII,95,, | Performed by: NURSE PRACTITIONER

## 2023-07-11 RX ORDER — ARIPIPRAZOLE 5 MG/1
5 TABLET ORAL
COMMUNITY
Start: 2023-05-10

## 2023-07-11 RX ORDER — PRAZOSIN HYDROCHLORIDE 2 MG/1
2 CAPSULE ORAL
COMMUNITY
Start: 2023-05-10
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
## 2023-07-13 LAB — HCV RNA SERPL NAA+PROBE-ACNC: NORMAL IU/ML

## 2023-07-13 NOTE — PROGRESS NOTES
HCV not detected. Please call pt with results confirming that her Hep C is cured and congratulate her on my behalf.  Also, please remind her to come in for final dose of Twinrix vaccination as discussed this week. Thank you.

## 2023-07-14 ENCOUNTER — TELEPHONE (OUTPATIENT)
Dept: INFECTIOUS DISEASES | Facility: CLINIC | Age: 48
End: 2023-07-14
Payer: COMMERCIAL

## 2023-07-14 NOTE — TELEPHONE ENCOUNTER
----- Message from LEONEL Lopez sent at 7/13/2023  3:37 PM CDT -----  HCV not detected. Please call pt with results confirming that her Hep C is cured and congratulate her on my behalf.  Also, please remind her to come in for final dose of Twinrix vaccination as discussed this week. Thank you.

## 2023-07-17 ENCOUNTER — CLINICAL SUPPORT (OUTPATIENT)
Dept: INFECTIOUS DISEASES | Facility: CLINIC | Age: 48
End: 2023-07-17
Payer: COMMERCIAL

## 2023-07-17 DIAGNOSIS — Z23 NEED FOR VACCINATION: ICD-10-CM

## 2023-07-17 PROCEDURE — 90636 HEP A/HEP B VACC ADULT IM: CPT | Mod: PBBFAC

## 2023-07-17 PROCEDURE — 90471 IMMUNIZATION ADMIN: CPT | Mod: PBBFAC

## 2023-07-17 NOTE — TELEPHONE ENCOUNTER
Phoned patient. Informed of negative hep C and cured. Congratulated. Voiced joyous appreciation. Per Nadine, to schedule significant other for Hep C new patient and  treatment with available appt next month. Please contact patient for information.

## 2023-08-16 LAB
INR PPP: 1
PROTHROMBIN TIME: 12.6 SECONDS (ref 11.4–14)

## 2023-10-13 DIAGNOSIS — I10 PRIMARY HYPERTENSION: ICD-10-CM

## 2023-10-13 RX ORDER — HYDROCHLOROTHIAZIDE 12.5 MG/1
12.5 TABLET ORAL
Qty: 90 TABLET | Refills: 3 | OUTPATIENT
Start: 2023-10-13

## 2023-10-13 NOTE — TELEPHONE ENCOUNTER
----- Message from Cindy Musa sent at 10/13/2023  1:39 PM CDT -----  Regarding: Refill  Provider: ERICA Sainz  Preferred Pharmacy: [facility]  Last Visit:  Next Visit 11/27/2023  Patient's Contact Number:    1. Name of Medication: Lisinopril  Dosage: 40 mg tab  Comments:    2. Name of Medication:  Dosage:  Comments:    3. Name of Medication:  Dosage:  Comments    4. Name of Medication:  Dosage:  Comments:    5. Name of Medication:  Dosage:  Comments:

## 2023-12-13 DIAGNOSIS — Z12.31 ENCOUNTER FOR SCREENING MAMMOGRAM FOR BREAST CANCER: Primary | ICD-10-CM

## 2024-01-04 ENCOUNTER — HOSPITAL ENCOUNTER (EMERGENCY)
Facility: HOSPITAL | Age: 49
Discharge: HOME | End: 2024-01-04
Attending: EMERGENCY MEDICINE
Payer: COMMERCIAL

## 2024-01-04 VITALS
OXYGEN SATURATION: 100 % | RESPIRATION RATE: 20 BRPM | SYSTOLIC BLOOD PRESSURE: 133 MMHG | WEIGHT: 160 LBS | BODY MASS INDEX: 27.31 KG/M2 | HEIGHT: 64 IN | DIASTOLIC BLOOD PRESSURE: 103 MMHG | HEART RATE: 100 BPM

## 2024-01-04 DIAGNOSIS — S46.911A STRAIN OF RIGHT SHOULDER, INITIAL ENCOUNTER: Primary | ICD-10-CM
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
## 2024-01-04 PROCEDURE — 99283 EMERGENCY DEPT VISIT LOW MDM: CPT

## 2024-01-04 RX ORDER — METHOCARBAMOL 500 MG/1
500 TABLET, FILM COATED ORAL 4 TIMES DAILY PRN
Qty: 20 TABLET | Refills: 0 | Status: SHIPPED | OUTPATIENT
Start: 2024-01-04

## 2024-01-04 NOTE — Clinical Note
"Dahiana Tobar" Miguel was seen and treated in our emergency department on 1/4/2024.  She may return to work on 01/09/2024.       If you have any questions or concerns, please don't hesitate to call.      Angela Cooley MD"

## 2024-01-05 NOTE — ED PROVIDER NOTES
Encounter Date: 2024       History     Chief Complaint   Patient presents with    Shoulder Pain     Pt to er with right shoulder pain x 5 days. Pt states that she was sleep walking and fell      48-year-old female had a fall 5 days ago and has had right shoulder pain since that time which is getting worse.  When she fell, she thought she did not hurt herself too bad but noticed a lot of bruising to the upper arm over the deltoid.  Initially the pain was mild but it is getting progressively worse.  She has full range of motion but certain movements worsen the pain.  No history of surgery to that shoulder.        Review of patient's allergies indicates:   Allergen Reactions    Morphine     Opioids - morphine analogues      Past Medical History:   Diagnosis Date    Bipolar disorder     Depression     Hypertension     PTSD (post-traumatic stress disorder)     Unspecified viral hepatitis C without hepatic coma      Past Surgical History:   Procedure Laterality Date    BILATERAL TUBAL LIGATION       SECTION      GANGLION CYST EXCISION      HEMORRHOID SURGERY      TONSILLECTOMY       Family History   Problem Relation Age of Onset    Hypertension Maternal Grandfather     Hypertension Paternal Grandfather     Rheum arthritis Mother     Rheum arthritis Father     Cancer Maternal Aunt     Cancer Maternal Grandmother      Social History     Tobacco Use    Smoking status: Every Day     Current packs/day: 0.50     Types: Cigarettes    Smokeless tobacco: Never   Substance Use Topics    Alcohol use: Not Currently    Drug use: Never     Review of Systems   Musculoskeletal:  Positive for arthralgias.   All other systems reviewed and are negative.      Physical Exam     Initial Vitals [24]   BP Pulse Resp Temp SpO2   (!) 133/103 100 20 -- 100 %      MAP       --         Physical Exam    Nursing note and vitals reviewed.  Constitutional: She appears well-developed and well-nourished.   Musculoskeletal:       Comments: Right shoulder has no dislocation but does have bruising to deltoid both anterior and posterior.  She is nontender to the shoulder joint.  She has full range of motion, normal , negative Garcia, negative Neer test.  Radial pulses 2+.           ED Course   Procedures  Labs Reviewed - No data to display       Imaging Results              X-Ray Shoulder Complete 2 View Right (Final result)  Result time 01/04/24 20:33:09      Final result by Elfego Ruiz MD (01/04/24 20:33:09)                   Impression:      No osseous abnormality identified.      Electronically signed by: Elfego Ruiz  Date:    01/04/2024  Time:    20:33               Narrative:    EXAMINATION:  XR SHOULDER COMPLETE 2 OR MORE VIEWS RIGHT    CLINICAL HISTORY:  Unspecified fall, initial encounter    TECHNIQUE:  Three views.    COMPARISON:  None available.    FINDINGS:  The osseous and articular surfaces are unremarkable.  There is no acute fracture, dislocation or arthritic change.  Alignment and position are unremarkable.  There is unremarkable mineralization of the bones.  No soft tissue calcifications identified.                                       Medications - No data to display  Medical Decision Making  48-year-old female had a fall 5 days ago and has had right shoulder pain since that time which is getting worse.  When she fell, she thought she did not hurt herself too bad but noticed a lot of bruising to the upper arm over the deltoid.  Initially the pain was mild but it is getting progressively worse.  She has full range of motion but certain movements worsen the pain.  No history of surgery to that shoulder.      Differential diagnosis includes but is not limited to fracture, sprain, rotator cuff injury, slap tear    Amount and/or Complexity of Data Reviewed  Discussion of management or test interpretation with external provider(s): Patient was seen and evaluated in the Emergency Department with history, physical exam  and x-ray.  Evaluation is benign but she certainly could have rotator cuff or SLAP tear.  I will give her prescription for muscle relaxant recommend she follow-up with her PCP for further care.  If no improvement, she may need an MRI for further evaluation.    Risk  Prescription drug management.                                      Clinical Impression:  Final diagnoses:  [W19.XXXA] Fall  [S46.871A] Strain of right shoulder, initial encounter (Primary)          ED Disposition Condition    Discharge           ED Prescriptions       Medication Sig Dispense Start Date End Date Auth. Provider    methocarbamoL (ROBAXIN) 500 MG Tab Take 1 tablet (500 mg total) by mouth 4 (four) times daily as needed (shoulder pain). 20 tablet 1/4/2024 -- Angela Cooley MD          Follow-up Information       Follow up With Specialties Details Why Contact Info    PCP  Schedule an appointment as soon as possible for a visit                Angela Cooley MD  01/05/24 2759

## 2024-01-18 ENCOUNTER — OFFICE VISIT (OUTPATIENT)
Dept: FAMILY MEDICINE | Facility: CLINIC | Age: 49
End: 2024-01-18
Payer: COMMERCIAL

## 2024-01-18 VITALS
HEART RATE: 89 BPM | DIASTOLIC BLOOD PRESSURE: 81 MMHG | WEIGHT: 190 LBS | RESPIRATION RATE: 18 BRPM | SYSTOLIC BLOOD PRESSURE: 122 MMHG | TEMPERATURE: 98 F | OXYGEN SATURATION: 99 % | HEIGHT: 64 IN | BODY MASS INDEX: 32.44 KG/M2

## 2024-01-18 DIAGNOSIS — I10 PRIMARY HYPERTENSION: Primary | ICD-10-CM

## 2024-01-18 DIAGNOSIS — Z12.12 ENCOUNTER FOR COLORECTAL CANCER SCREENING: ICD-10-CM

## 2024-01-18 DIAGNOSIS — S46.911A STRAIN OF RIGHT SHOULDER, INITIAL ENCOUNTER: ICD-10-CM
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
## 2024-01-18 DIAGNOSIS — Z12.31 ENCOUNTER FOR SCREENING MAMMOGRAM FOR BREAST CANCER: ICD-10-CM

## 2024-01-18 DIAGNOSIS — Z12.11 ENCOUNTER FOR COLORECTAL CANCER SCREENING: ICD-10-CM

## 2024-01-18 PROBLEM — M25.512 ACUTE PAIN OF LEFT SHOULDER: Status: ACTIVE | Noted: 2024-01-18

## 2024-01-18 PROCEDURE — 3008F BODY MASS INDEX DOCD: CPT | Mod: CPTII,,, | Performed by: NURSE PRACTITIONER

## 2024-01-18 PROCEDURE — 1160F RVW MEDS BY RX/DR IN RCRD: CPT | Mod: CPTII,,, | Performed by: NURSE PRACTITIONER

## 2024-01-18 PROCEDURE — 3074F SYST BP LT 130 MM HG: CPT | Mod: CPTII,,, | Performed by: NURSE PRACTITIONER

## 2024-01-18 PROCEDURE — 99214 OFFICE O/P EST MOD 30 MIN: CPT | Mod: S$PBB,,, | Performed by: NURSE PRACTITIONER

## 2024-01-18 PROCEDURE — 99215 OFFICE O/P EST HI 40 MIN: CPT | Mod: PBBFAC | Performed by: NURSE PRACTITIONER

## 2024-01-18 PROCEDURE — 3079F DIAST BP 80-89 MM HG: CPT | Mod: CPTII,,, | Performed by: NURSE PRACTITIONER

## 2024-01-18 PROCEDURE — 4010F ACE/ARB THERAPY RXD/TAKEN: CPT | Mod: CPTII,,, | Performed by: NURSE PRACTITIONER

## 2024-01-18 PROCEDURE — 1159F MED LIST DOCD IN RCRD: CPT | Mod: CPTII,,, | Performed by: NURSE PRACTITIONER

## 2024-01-18 RX ORDER — DICLOFENAC SODIUM 75 MG/1
75 TABLET, DELAYED RELEASE ORAL 2 TIMES DAILY PRN
Qty: 30 TABLET | Refills: 1 | Status: SHIPPED | OUTPATIENT
Start: 2024-01-18

## 2024-01-18 RX ORDER — HYDROCHLOROTHIAZIDE 12.5 MG/1
12.5 TABLET ORAL DAILY
Qty: 90 TABLET | Refills: 3 | Status: SHIPPED | OUTPATIENT
Start: 2024-01-18 | End: 2025-01-17

## 2024-01-18 RX ORDER — LISINOPRIL 40 MG/1
40 TABLET ORAL DAILY
Qty: 90 EACH | Refills: 3 | Status: SHIPPED | OUTPATIENT
Start: 2024-01-18 | End: 2025-01-17

## 2024-01-18 RX ORDER — PREDNISONE 20 MG/1
20 TABLET ORAL 2 TIMES DAILY
Qty: 10 TABLET | Refills: 0 | Status: SHIPPED | OUTPATIENT
Start: 2024-01-18 | End: 2024-01-23

## 2024-01-18 NOTE — ASSESSMENT & PLAN NOTE
Controlled, blood pressure 122/81.  Patient state she is compliant with lisinopril 40 mg p.o. daily and hydrochlorothiazide 12.5 mg p.o. daily.  Follow low-salt diet.  Stay physically active.  Exercise at least 30 minutes a day up to 5 days a week a simple as brisk walking, advance as tolerated.  Stay hydrated with water.  Follow low-fat, low-cholesterol diet.  Medications sent to preferred pharmacy.  Continue to monitor blood pressure at home.  Return to clinic in 3 months for routine follow-up.  Questions solicited and answered, patient verbalized and agreed to plan.

## 2024-01-18 NOTE — ASSESSMENT & PLAN NOTE
Rt shoulder:  State she had fell on January 1, 2024 at home and landed on her right shoulder.  Patient ended up seeking help by going to emergency room department for management.  X-rays reviewed at bedside:  FINDINGS:  The osseous and articular surfaces are unremarkable.  There is no acute fracture, dislocation or arthritic change.  Alignment and position are unremarkable.  There is unremarkable mineralization of the bones.  No soft tissue calcifications identified.     Impression:   No osseous abnormality identified.    Patient was prescribed Robaxin 500 mg p.o. 4 times a day as needed for pain.  Did not  prescription.  Patient continued to have pain right shoulder specifically anterior With palpation.  Discomfort with a full range of motion.  No crepitus, no deformity, no edema, no bruising noted on exam.  Bilateral hand  strong and equal.   Discussed with patient to go and  Robaxin 500 mg and to take as directed.  Discussed precautions.  Patient verbalized  Discussed Rx prednisone 20 mg p.o. b.i.d. for 5 days, take with food and stay hydrated with water.  Discussed Diflucan 75 mg p.o. b.i.d. as needed for pain, take with food, do not take with any other NSAIDs such as naproxen, Aleve, ibuprofen, Motrin, Advil or aspirins.  Discussed physical therapy, patient agreed, referral to [facility] initiated.  Discussed ice, rest.  Return to clinic in 3 months or sooner if no improvement.  Read discharge education material.

## 2024-01-18 NOTE — PATIENT INSTRUCTIONS
Cortez Talbot,     If you are due for any health screening(s) below please notify me so we can arrange them to be ordered and scheduled. Most healthy patients at your age complete them, but you are free to accept or refuse.     If you can't do it, I'll definitely understand. If you can, I'd certainly appreciate it!    Tests to Keep You Healthy    Mammogram: ORDERED BUT NOT SCHEDULED  Colon Cancer Screening: ORDERED  Cervical Cancer Screening: DUE  Last Blood Pressure <= 139/89 (1/18/2024): Yes  Tobacco Cessation: NO      Schedule your breast cancer screening today     Breast cancer is the second most common cancer in women,  and the second leading cause of death from cancer. Mammograms can detect breast cancer early, which significantly increases the chances of curing the cancer.       Our records indicate that you may be overdue for breast cancer screening. Cancer screenings save lives, so schedule yours today to stay healthy.     If you recently had a mammogram performed outside of [facility], please let your Health care team know so that they can update your health record.        Its time for your colon cancer screening     Colorectal cancer is one of the leading causes of cancer death for men and women but it doesnt have to be. Screenings can prevent colorectal cancer or find it early enough to treat and cure the disease.     Our records indicate that you may be overdue for colon cancer screening. A colonoscopy or stool screening test can help identify patients at risk for developing colon cancer. Cancer screenings save lives, so schedule yours today to stay healthy.     A colonoscopy is the preferred test for detecting colon cancer. It is needed only once every 10 years if results are negative. While you are sedated, a flexible, lighted tube with a tiny camera is inserted into the rectum and advanced through the colon to look for cancers.     An alternative screening test that is used at home and  returned to the lab may also be used. It detects hidden blood in bowel movements which could indicate cancer in the colon. If results are positive, you will need a colonoscopy to determine if the blood is a sign of cancer. This type of follow up (diagnostic) colonoscopy usually requires additional copays as required by your insurance provider.     If you recently had your colon cancer screening performed outside of [facility], please let your Health care team know so that they can update your health record. Please contact your PCP if you have any questions.    Your cervical cancer screening is due     Our records indicate that you may be overdue for your screening Pap smear. A Pap smear is an important health screening that can detect abnormal cells that can become cervical cancer. Cervical cancer screenings allow for early diagnosis and increase the likelihood of successful treatment.     The current recommendation for Pap smear screening is every 3-5 years for women at average risk. We encourage you to schedule your appointment with your womens health provider. Many women see a gynecologist for this screening, but some primary care providers also provide Pap screening.     If you recently had your Pap smear screening performed outside of [facility], please let your health care team know so that they can update your health record.      Were here to help you quit smoking     Our records indicated that you are still smoking. One of the best things you can do for your health is to stop smoking and we are here to help.     Talk with your provider about our Smoking Cessation Program and how we can support you on your journey.

## 2024-01-18 NOTE — PROGRESS NOTES
Patient Name: Dahiana Rivera   : 1975  MRN: [id number]     SUBJECTIVE DATA:    CHIEF COMPLAINT:   Dahiana Rivera is a 48 y.o. female who presents to clinic today with Shoulder Pain and Hypertension      HPI:  48-year-old female presents to the clinic to follow-up on hypertension as well to follow-up on an injury sustained on 2024 from a fall with right shoulder pain.    Rt shoulder:  State she had fell on 2024 at home and landed on her right shoulder.  Patient ended up seeking help by going to emergency room department for management.  X-rays reviewed at bedside:  FINDINGS:  The osseous and articular surfaces are unremarkable.  There is no acute fracture, dislocation or arthritic change.  Alignment and position are unremarkable.  There is unremarkable mineralization of the bones.  No soft tissue calcifications identified.     Impression:   No osseous abnormality identified.    Patient was prescribed Robaxin 500 mg p.o. 4 times a day as needed for pain.  Did not  prescription.  Patient continued to have pain right shoulder specifically anterior With palpation.  Discomfort with a full range of motion.  No crepitus, no deformity, no edema, no bruising noted on exam.  Bilateral hand  strong and equal.   Discussed with patient to go and  Robaxin 500 mg and to take as directed.  Discussed precautions.  Patient verbalized  Discussed Rx prednisone 20 mg p.o. b.i.d. for 5 days, take with food and stay hydrated with water.  Discussed Diflucan 75 mg p.o. b.i.d. as needed for pain, take with food, do not take with any other NSAIDs such as naproxen, Aleve, ibuprofen, Motrin, Advil or aspirins.  Discussed physical therapy, patient agreed, referral to MTS initiated.  Discussed ice, rest.  Return to clinic in 3 months or sooner if no improvement.    Hypertension:  Controlled, blood pressure 122/81.  Patient state she is compliant with lisinopril 40 mg p.o. daily and  "hydrochlorothiazide 12.5 mg p.o. daily.  Follow low-salt diet.  Stay physically active.  Exercise at least 30 minutes a day up to 5 days a week a simple as brisk walking, advance as tolerated.  Stay hydrated with water.  Follow low-fat, low-cholesterol diet.  Medications sent to preferred pharmacy.  Continue to monitor blood pressure at home.  Return to clinic in 3 months for routine follow-up.  Questions solicited and answered, patient verbalized and agreed to plan.    Patient denies chest pain, shortness of breath, dyspnea on exertion, palpitations, peripheral edema, abdominal pain, nausea, vomiting, diarrhea, constipation, fatigue, fever, chills, dysuria,  hematuria, melena, or hematochezia.      ALLERGIES:   Review of patient's allergies indicates:   Allergen Reactions    Morphine     Opioids - morphine analogues          ROS:  Review of Systems   Musculoskeletal:  Positive for joint pain (Right shoulder pain).   All other systems reviewed and are negative.        OBJECTIVE DATA:  Vital signs  Vitals:    01/18/24 1424   BP: 122/81   Pulse: 89   Resp: 18   Temp: 97.8 °F (36.6 °C)   TempSrc: Oral   SpO2: 99%   Weight: 86.2 kg (190 lb)   Height: 5' 4" (1.626 m)      Body mass index is 32.61 kg/m².    PHYSICAL EXAM:   Physical Exam  Vitals and nursing note reviewed.   Constitutional:       General: She is awake. She is not in acute distress.     Appearance: Normal appearance. She is well-developed and well-groomed. She is obese. She is not ill-appearing, toxic-appearing or diaphoretic.   HENT:      Head: Normocephalic and atraumatic.      Right Ear: Tympanic membrane, ear canal and external ear normal.      Left Ear: Tympanic membrane, ear canal and external ear normal.      Nose: Nose normal.      Mouth/Throat:      Mouth: Mucous membranes are moist.      Pharynx: Oropharynx is clear. Uvula midline.   Eyes:      General: Lids are normal. No scleral icterus.     Extraocular Movements: Extraocular movements intact. "      Conjunctiva/sclera: Conjunctivae normal.      Pupils: Pupils are equal, round, and reactive to light.   Neck:      Trachea: Trachea and phonation normal.   Cardiovascular:      Rate and Rhythm: Normal rate and regular rhythm.      Pulses: Normal pulses.           Radial pulses are 2+ on the right side and 2+ on the left side.      Heart sounds: Normal heart sounds. No murmur heard.  Pulmonary:      Effort: Pulmonary effort is normal.      Breath sounds: Normal breath sounds and air entry. No wheezing or rhonchi.   Abdominal:      Palpations: Abdomen is soft.   Musculoskeletal:         General: Tenderness present. No swelling or deformity. Normal range of motion.      Right shoulder: Tenderness present. No swelling, deformity, laceration, bony tenderness or crepitus. Normal range of motion. Normal strength. Normal pulse.      Left shoulder: Normal.        Arms:       Cervical back: Normal range of motion and neck supple. No rigidity or tenderness.   Lymphadenopathy:      Cervical: No cervical adenopathy.   Skin:     General: Skin is warm and dry.      Capillary Refill: Capillary refill takes less than 2 seconds.      Findings: No bruising.   Neurological:      General: No focal deficit present.      Mental Status: She is alert and oriented to person, place, and time. Mental status is at baseline.      GCS: GCS eye subscore is 4. GCS verbal subscore is 5. GCS motor subscore is 6.      Cranial Nerves: Cranial nerves 2-12 are intact. No cranial nerve deficit.      Sensory: Sensation is intact. No sensory deficit.      Motor: Motor function is intact. No weakness.      Coordination: Coordination is intact. Coordination normal.      Gait: Gait is intact. Gait normal.   Psychiatric:         Attention and Perception: Attention and perception normal.         Mood and Affect: Mood and affect normal.         Speech: Speech normal.         Behavior: Behavior normal. Behavior is cooperative.         Thought Content: Thought  content normal.         Cognition and Memory: Cognition and memory normal.         Judgment: Judgment normal.          ASSESSMENT/PLAN:  1. Primary hypertension  Overview:  The patient presents with essential hypertension.  The patient is tolerating the medication well and is in excellent compliance.  The patient is experiencing no side effects.  Counseling was offered regarding low salt diets.  The patient has a reduced salt intake.  The patient denies chest pain, palpitations, shortness of breath, dyspnea on exertion, left or murmur neck pain, nausea, vomiting, diaphoresis, paroxysmal nocturnal dyspnea, and orthopnea.   Hypertension Medications               lisinopriL 10 MG tablet Take 20 mg by mouth once daily.    prazosin (MINIPRESS) 1 MG Cap Take by mouth 2 (two) times daily.        Has been [facility] meds for 5 months -was on lisinopril          Assessment & Plan:  Controlled, blood pressure 122/81.  Patient state she is compliant with lisinopril 40 mg p.o. daily and hydrochlorothiazide 12.5 mg p.o. daily.  Follow low-salt diet.  Stay physically active.  Exercise at least 30 minutes a day up to 5 days a week a simple as brisk walking, advance as tolerated.  Stay hydrated with water.  Follow low-fat, low-cholesterol diet.  Medications sent to preferred pharmacy.  Continue to monitor blood pressure at home.  Return to clinic in 3 months for routine follow-up.  Questions solicited and answered, patient verbalized and agreed to plan.    Orders:  -     lisinopriL (PRINIVIL,ZESTRIL) 40 MG tablet; Take 1 tablet (40 mg total) by mouth once daily.  Dispense: 90 each; Refill: 3  -     hydroCHLOROthiazide (HYDRODIURIL) 12.5 MG Tab; Take 1 tablet (12.5 mg total) by mouth once daily.  Dispense: 90 tablet; Refill: 3    2. Strain of right shoulder, initial encounter  Assessment & Plan:  Rt shoulder:  State she had fell on January 1, 2024 at home and landed on her right shoulder.  Patient ended up seeking help by going to  emergency room department for management.  X-rays reviewed at bedside:  FINDINGS:  The osseous and articular surfaces are unremarkable.  There is no acute fracture, dislocation or arthritic change.  Alignment and position are unremarkable.  There is unremarkable mineralization of the bones.  No soft tissue calcifications identified.     Impression:   No osseous abnormality identified.    Patient was prescribed Robaxin 500 mg p.o. 4 times a day as needed for pain.  Did not  prescription.  Patient continued to have pain right shoulder specifically anterior With palpation.  Discomfort with a full range of motion.  No crepitus, no deformity, no edema, no bruising noted on exam.  Bilateral hand  strong and equal.   Discussed with patient to go and  Robaxin 500 mg and to take as directed.  Discussed precautions.  Patient verbalized  Discussed Rx prednisone 20 mg p.o. b.i.d. for 5 days, take with food and stay hydrated with water.  Discussed Diflucan 75 mg p.o. b.i.d. as needed for pain, take with food, do not take with any other NSAIDs such as naproxen, Aleve, ibuprofen, Motrin, Advil or aspirins.  Discussed physical therapy, patient agreed, referral to MTS initiated.  Discussed ice, rest.  Return to clinic in 3 months or sooner if no improvement.  Read discharge education material.    Orders:  -     diclofenac (VOLTAREN) 75 MG EC tablet; Take 1 tablet (75 mg total) by mouth 2 (two) times daily as needed (pain).  Dispense: 30 tablet; Refill: 1  -     predniSONE (DELTASONE) 20 MG tablet; Take 1 tablet (20 mg total) by mouth 2 (two) times daily. for 5 days  Dispense: 10 tablet; Refill: 0  -     Ambulatory referral/consult to Physical/Occupational Therapy; Future; Expected date: 01/25/2024    3. Fall, initial encounter  Assessment & Plan:  Rt shoulder:  State she had fell on January 1, 2024 at home and landed on her right shoulder.  Patient ended up seeking help by going to emergency room department for  management.  X-rays reviewed at bedside:  FINDINGS:  The osseous and articular surfaces are unremarkable.  There is no acute fracture, dislocation or arthritic change.  Alignment and position are unremarkable.  There is unremarkable mineralization of the bones.  No soft tissue calcifications identified.     Impression:   No osseous abnormality identified.    Patient was prescribed Robaxin 500 mg p.o. 4 times a day as needed for pain.  Did not  prescription.  Patient continued to have pain right shoulder specifically anterior With palpation.  Discomfort with a full range of motion.  No crepitus, no deformity, no edema, no bruising noted on exam.  Bilateral hand  strong and equal.   Discussed with patient to go and  Robaxin 500 mg and to take as directed.  Discussed precautions.  Patient verbalized  Discussed Rx prednisone 20 mg p.o. b.i.d. for 5 days, take with food and stay hydrated with water.  Discussed Diflucan 75 mg p.o. b.i.d. as needed for pain, take with food, do not take with any other NSAIDs such as naproxen, Aleve, ibuprofen, Motrin, Advil or aspirins.  Discussed physical therapy, patient agreed, referral to MTS initiated.  Discussed ice, rest.  Return to clinic in 3 months or sooner if no improvement.    Orders:  -     diclofenac (VOLTAREN) 75 MG EC tablet; Take 1 tablet (75 mg total) by mouth 2 (two) times daily as needed (pain).  Dispense: 30 tablet; Refill: 1  -     predniSONE (DELTASONE) 20 MG tablet; Take 1 tablet (20 mg total) by mouth 2 (two) times daily. for 5 days  Dispense: 10 tablet; Refill: 0  -     Ambulatory referral/consult to Physical/Occupational Therapy; Future; Expected date: 01/25/2024    4. Encounter for colorectal cancer screening  -     OCCULT BLOOD FECAL IMMUNOASSAY; Future; Expected date: 01/18/2024    5. Encounter for screening mammogram for breast cancer  -     Mammo Digital Screening Bilat w/ Papi; Future; Expected date: 01/18/2024           RESULTS:  No  results found for this or any previous visit (from the past 1008 hour(s)).      Follow Up:  Follow up in about 3 months (around 4/18/2024).      Previous medical history/lab work/radiology reviewed and considered during medical management decisions.   Medication list reviewed and medication reconciliation performed.  Patient was provided  and care about his/her current diagnosis (es) and medications including risk/benefit and side effects/adverse events, over the counter medication uses/doses, home self-care and contact precautions,  and red flags and indications for when to seek immediate medical attention.   Patient was advised to continue compliance with current medication list and medical recommendations.  Patient dvised continued compliance with recommended eating habits/ diets for medical conditions and exercise 150 minutes/ week (if possible) for medical condition (s).  Educational handouts and instructions on selected disease management in AVS (After Visit Summary).    All of the patient's questions were answered to patient's satisfaction.   The patient was receptive, expressed verbal understanding and agreement the above plan.      This note was created with the assistance of a voice recognition software or phone dictation. There may be transcription errors as a result of using this technology however minimal. Effort has been made to assure accuracy of transcription but any obvious errors or omissions should be clarified with the author of the document     Burow's Graft Text: The defect edges were debeveled with a #15 scalpel blade.  Given the location of the defect, shape of the defect, the proximity to free margins and the presence of a standing cone deformity a Burow's skin graft was deemed most appropriate. The standing cone was removed and this tissue was then trimmed to the shape of the primary defect. The adipose tissue was also removed until only dermis and epidermis were left.  The skin margins of the secondary defect were undermined to an appropriate distance in all directions utilizing iris scissors.  The secondary defect was closed with interrupted buried subcutaneous sutures.  The skin edges were then re-apposed with running  sutures.  The skin graft was then placed in the primary defect and oriented appropriately.

## 2024-01-18 NOTE — ASSESSMENT & PLAN NOTE
Rt shoulder:  State she had fell on January 1, 2024 at home and landed on her right shoulder.  Patient ended up seeking help by going to emergency room department for management.  X-rays reviewed at bedside:  FINDINGS:  The osseous and articular surfaces are unremarkable.  There is no acute fracture, dislocation or arthritic change.  Alignment and position are unremarkable.  There is unremarkable mineralization of the bones.  No soft tissue calcifications identified.     Impression:   No osseous abnormality identified.    Patient was prescribed Robaxin 500 mg p.o. 4 times a day as needed for pain.  Did not  prescription.  Patient continued to have pain right shoulder specifically anterior With palpation.  Discomfort with a full range of motion.  No crepitus, no deformity, no edema, no bruising noted on exam.  Bilateral hand  strong and equal.   Discussed with patient to go and  Robaxin 500 mg and to take as directed.  Discussed precautions.  Patient verbalized  Discussed Rx prednisone 20 mg p.o. b.i.d. for 5 days, take with food and stay hydrated with water.  Discussed Diflucan 75 mg p.o. b.i.d. as needed for pain, take with food, do not take with any other NSAIDs such as naproxen, Aleve, ibuprofen, Motrin, Advil or aspirins.  Discussed physical therapy, patient agreed, referral to MTS initiated.  Discussed ice, rest.  Return to clinic in 3 months or sooner if no improvement.

## 2024-02-19 ENCOUNTER — PATIENT MESSAGE (OUTPATIENT)
Dept: FAMILY MEDICINE | Facility: CLINIC | Age: 49
End: 2024-02-19
Payer: COMMERCIAL

## 2024-02-20 DIAGNOSIS — S46.911A STRAIN OF RIGHT SHOULDER, INITIAL ENCOUNTER: ICD-10-CM
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
## 2024-02-20 RX ORDER — DICLOFENAC SODIUM 75 MG/1
75 TABLET, DELAYED RELEASE ORAL 2 TIMES DAILY PRN
Qty: 30 TABLET | Refills: 1 | OUTPATIENT
Start: 2024-02-20

## 2024-04-10 NOTE — ASSESSMENT & PLAN NOTE
Patient did not bring blood pressure log.  Instructed patient to bring log next visit.  Rx lisinopril 40 mg with hydrochlorothiazide 12.5 mg p.o. once daily, call for refills as needed.  Blood pressure uncontrolled 175/99.  Return to clinic in 2 weeks or sooner if blood pressure not corrected with medications.   Follow low-salt, low-cholesterol, low-fat diet.  Stay active, exercise up to 30 minutes a day as tolerated, 5 days a week.  Stay hydrated with fluids specially water.  Stop smoking.  If you change of mind return refer you to smoke cessation, patient verbalized.  Eat fresh fruits and vegetables.  Return to clinic sooner if needed.  Keep follow-up appointment in 3 months.  Portion solicited and answered, patient verbalized.   denies

## 2024-06-19 ENCOUNTER — TELEPHONE (OUTPATIENT)
Dept: FAMILY MEDICINE | Facility: CLINIC | Age: 49
End: 2024-06-19
Payer: COMMERCIAL

## 2024-06-19 NOTE — TELEPHONE ENCOUNTER
----- Message from ERICA Robles sent at 5/9/2024  1:22 PM CDT -----  Regarding: Colorectal cancer screen  Please advise patient to collect stool sample to complete colorectal cancer screening, thank you

## 2024-08-13 ENCOUNTER — OFFICE VISIT (OUTPATIENT)
Dept: FAMILY MEDICINE | Facility: CLINIC | Age: 49
End: 2024-08-13
Payer: COMMERCIAL

## 2024-08-13 VITALS
WEIGHT: 178 LBS | HEIGHT: 64 IN | BODY MASS INDEX: 30.39 KG/M2 | TEMPERATURE: 97 F | SYSTOLIC BLOOD PRESSURE: 122 MMHG | RESPIRATION RATE: 18 BRPM | DIASTOLIC BLOOD PRESSURE: 75 MMHG | OXYGEN SATURATION: 99 % | HEART RATE: 88 BPM

## 2024-08-13 DIAGNOSIS — Z12.12 ENCOUNTER FOR COLORECTAL CANCER SCREENING: ICD-10-CM

## 2024-08-13 DIAGNOSIS — Z12.4 ENCOUNTER FOR PAPANICOLAOU SMEAR OF CERVIX: ICD-10-CM

## 2024-08-13 DIAGNOSIS — M79.671 RIGHT FOOT PAIN: ICD-10-CM

## 2024-08-13 DIAGNOSIS — Z12.31 ENCOUNTER FOR SCREENING MAMMOGRAM FOR BREAST CANCER: ICD-10-CM

## 2024-08-13 DIAGNOSIS — Z12.11 ENCOUNTER FOR COLORECTAL CANCER SCREENING: ICD-10-CM

## 2024-08-13 DIAGNOSIS — I10 PRIMARY HYPERTENSION: Primary | ICD-10-CM

## 2024-08-13 DIAGNOSIS — Z72.0 TOBACCO USER: ICD-10-CM

## 2024-08-13 PROCEDURE — 3078F DIAST BP <80 MM HG: CPT | Mod: CPTII,,, | Performed by: NURSE PRACTITIONER

## 2024-08-13 PROCEDURE — 3008F BODY MASS INDEX DOCD: CPT | Mod: CPTII,,, | Performed by: NURSE PRACTITIONER

## 2024-08-13 PROCEDURE — 99215 OFFICE O/P EST HI 40 MIN: CPT | Mod: PBBFAC | Performed by: NURSE PRACTITIONER

## 2024-08-13 PROCEDURE — 1160F RVW MEDS BY RX/DR IN RCRD: CPT | Mod: CPTII,,, | Performed by: NURSE PRACTITIONER

## 2024-08-13 PROCEDURE — 1159F MED LIST DOCD IN RCRD: CPT | Mod: CPTII,,, | Performed by: NURSE PRACTITIONER

## 2024-08-13 PROCEDURE — 3074F SYST BP LT 130 MM HG: CPT | Mod: CPTII,,, | Performed by: NURSE PRACTITIONER

## 2024-08-13 PROCEDURE — 4010F ACE/ARB THERAPY RXD/TAKEN: CPT | Mod: CPTII,,, | Performed by: NURSE PRACTITIONER

## 2024-08-13 PROCEDURE — 99214 OFFICE O/P EST MOD 30 MIN: CPT | Mod: S$PBB,25,, | Performed by: NURSE PRACTITIONER

## 2024-08-13 PROCEDURE — 99406 BEHAV CHNG SMOKING 3-10 MIN: CPT | Mod: S$PBB,,, | Performed by: NURSE PRACTITIONER

## 2024-08-13 RX ORDER — PREDNISONE 20 MG/1
20 TABLET ORAL 2 TIMES DAILY
Qty: 10 TABLET | Refills: 0 | Status: SHIPPED | OUTPATIENT
Start: 2024-08-13 | End: 2024-08-18

## 2024-08-13 RX ORDER — DICLOFENAC SODIUM 75 MG/1
75 TABLET, DELAYED RELEASE ORAL 2 TIMES DAILY PRN
Qty: 20 TABLET | Refills: 1 | Status: SHIPPED | OUTPATIENT
Start: 2024-08-13

## 2024-08-13 NOTE — ASSESSMENT & PLAN NOTE
Smoking Cessation:  Smoker, assistance with smoking cessation was offered including: counseling and printed information on smoking cessation. The patient was counseled regarding smoking and smoking cessation for 3-10 minutes.   Patient declined smoke cessation.

## 2024-08-13 NOTE — ASSESSMENT & PLAN NOTE
Patient stated the incident happens on May 11, 2024 while she was vacuuming the floor.  Patient state she got tangled on the electric Cable and she fell landed on her right knee.  Patient has showed pictures on her phone from May  2024 incident.  Bruising was noted and swelling as well.  Patient state top of her right foot and the bottom of her foot hurts when she ambulates only.  Patient state that is started about a month ago.  Patient denies any recent fall or trauma.  Discussed x-rays.  Patient declined at this time.  Patient will try medications 1st.  Rx prednisone 20 mg p.o. twice a day for 5 days.  Rx diclofenac 75 mg p.o. b.i.d. as needed for pain.  Take with food and stay hydrated with water.  Do not take with any other NSAIDs such as ibuprofen, Aleve, naproxen, Motrin, aspirins or Advil.  Elevate, rest, ice.  May utilize a frozen bottle water inserted into a sock and massage bottom of feet by rolling the bottle back and forth.  You can ice between 20-30 minutes a day up to 4 days a week as needed for pain or discomfort.  Do the same to top of foot by applying ice as needed.  Read discharge education materials.  Patient to return to clinic if no improvement.  Questions solicited and answered, patient verbalized understanding and agreed to plan.

## 2024-08-13 NOTE — PATIENT INSTRUCTIONS
Cortez Talbot,     If you are due for any health screening(s) below please notify me so we can arrange them to be ordered and scheduled. Most healthy patients at your age complete them, but you are free to accept or refuse.     If you can't do it, I'll definitely understand. If you can, I'd certainly appreciate it!    Tests to Keep You Healthy    Mammogram: ORDERED BUT NOT SCHEDULED  Colon Cancer Screening: ORDERED  Cervical Cancer Screening: DUE  Last Blood Pressure <= 139/89 (8/13/2024): Yes  Tobacco Cessation: NO      Schedule your breast cancer screening today     Breast cancer is the second most common cancer in women,  and the second leading cause of death from cancer. Mammograms can detect breast cancer early, which significantly increases the chances of curing the cancer.       Our records indicate that you may be overdue for breast cancer screening. Cancer screenings save lives, so schedule yours today to stay healthy.     If you recently had a mammogram performed outside of [facility], please let your Health care team know so that they can update your health record.        Its time for your colon cancer screening     Colorectal cancer is one of the leading causes of cancer death for men and women but it doesnt have to be. Screenings can prevent colorectal cancer or find it early enough to treat and cure the disease.     Our records indicate that you may be overdue for colon cancer screening. A colonoscopy or stool screening test can help identify patients at risk for developing colon cancer. Cancer screenings save lives, so schedule yours today to stay healthy.     A colonoscopy is the preferred test for detecting colon cancer. It is needed only once every 10 years if results are negative. While you are sedated, a flexible, lighted tube with a tiny camera is inserted into the rectum and advanced through the colon to look for cancers.     An alternative screening test that is used at home and  returned to the lab may also be used. It detects hidden blood in bowel movements which could indicate cancer in the colon. If results are positive, you will need a colonoscopy to determine if the blood is a sign of cancer. This type of follow up (diagnostic) colonoscopy usually requires additional copays as required by your insurance provider.     If you recently had your colon cancer screening performed outside of [facility], please let your Health care team know so that they can update your health record. Please contact your PCP if you have any questions.    Your cervical cancer screening is due     Our records indicate that you may be overdue for your screening Pap smear. A Pap smear is an important health screening that can detect abnormal cells that can become cervical cancer. Cervical cancer screenings allow for early diagnosis and increase the likelihood of successful treatment.     The current recommendation for Pap smear screening is every 3-5 years for women at average risk. We encourage you to schedule your appointment with your womens health provider. Many women see a gynecologist for this screening, but some primary care providers also provide Pap screening.     If you recently had your Pap smear screening performed outside of [facility], please let your health care team know so that they can update your health record.      Were here to help you quit smoking     Our records indicated that you are still smoking. One of the best things you can do for your health is to stop smoking and we are here to help.     Talk with your provider about our Smoking Cessation Program and how we can support you on your journey.

## 2024-08-13 NOTE — ASSESSMENT & PLAN NOTE
Controlled.  Current blood pressure 122/75.  Patient is compliant with lisinopril 40 mg p.o. once daily and hydrochlorothiazide 12.5 mg p.o. once daily.  Patient is benefitting from blood pressure and she would like to continue with same dose.  Discussed smoke cessation and its affect on blood pressure as well lungs.  Stay physically active and exercise at least 30 minutes a day up to 5 days a week as simple as brisk walking.  Patient to read discharge education materials.  Questions solicited and answered, patient verbalized and agreed to plan.

## 2024-08-13 NOTE — PROGRESS NOTES
Patient Name: Daihana Rivera   : 1975  MRN: [id number]     SUBJECTIVE DATA:    CHIEF COMPLAINT:   Dahiana Rivera is a 48 y.o. female who presents to clinic today with Leg Pain and Hypertension        HPI:  48-year-old female presents to the clinic to follow-up on hypertension and also to discuss right foot pain dorsal and plantar.      Hypertension:  Controlled.  Current blood pressure 122/75.  Patient is compliant with lisinopril 40 mg p.o. once daily and hydrochlorothiazide 12.5 mg p.o. once daily.  Patient is benefitting from blood pressure and she would like to continue with same dose.  Discussed smoke cessation and its affect on blood pressure as well lungs.  Stay physically active and exercise at least 30 minutes a day up to 5 days a week as simple as brisk walking.  Patient to read discharge education materials.  Questions solicited and answered, patient verbalized and agreed to plan.      Right knee pain/right foot pain:  Patient stated the incident happens on May 11, 2024 while she was vacuuming the floor.  Patient state she got tangled on the electric Cable and she fell landed on her right knee.  Patient has showed pictures on her phone from May  2024 incident.  Bruising was noted and swelling as well.  Patient state top of her right foot and the bottom of her foot hurts when she ambulates only.  Patient state that is started about a month ago.  Patient denies any recent fall or trauma.  Discussed x-rays.  Patient declined at this time.  Patient will try medications 1st.  Rx prednisone 20 mg p.o. twice a day for 5 days.  Rx diclofenac 75 mg p.o. b.i.d. as needed for pain.  Take with food and stay hydrated with water.  Do not take with any other NSAIDs such as ibuprofen, Aleve, naproxen, Motrin, aspirins or Advil.  Elevate, rest, ice.  May utilize a frozen bottle water inserted into a sock and massage bottom of feet by rolling the bottle back and forth.  You can ice between 20-30 minutes a day up  "to 4 days a week as needed for pain or discomfort.  Do the same to top of foot by applying ice as needed.  Read discharge education materials.  Patient to return to clinic if no improvement.  Questions solicited and answered, patient verbalized understanding and agreed to plan.    Smoke cessation:  Smoker, assistance with smoking cessation was offered including: counseling and printed information on smoking cessation. The patient was counseled regarding smoking and smoking cessation for 3-10 minutes.   Patient declines smoke cessation at this time.  Patient to notify the clinic when ready.      Patient denies chest pain, shortness of breath, dyspnea on exertion, palpitations, peripheral edema, abdominal pain, nausea, vomiting, diarrhea, constipation, fatigue, fever, chills, dysuria,  hematuria, dark stools or bloody stools.    ALLERGIES:   Review of patient's allergies indicates:   Allergen Reactions    Morphine     Opioids - morphine analogues          ROS:  Review of Systems   Musculoskeletal:  Positive for joint pain (Right foot).   All other systems reviewed and are negative.        OBJECTIVE DATA:  Vital signs  Vitals:    08/13/24 1156   BP: 122/75   Pulse: 88   Resp: 18   Temp: 97.4 °F (36.3 °C)   TempSrc: Oral   SpO2: 99%   Weight: 80.7 kg (178 lb)   Height: 5' 4" (1.626 m)      Body mass index is 30.55 kg/m².    PHYSICAL EXAM:   Physical Exam  Vitals and nursing note reviewed.   Constitutional:       General: She is awake. She is not in acute distress.     Appearance: Normal appearance. She is well-developed and well-groomed. She is obese. She is not ill-appearing, toxic-appearing or diaphoretic.   HENT:      Head: Normocephalic and atraumatic.      Right Ear: Tympanic membrane, ear canal and external ear normal.      Left Ear: Tympanic membrane, ear canal and external ear normal.      Nose: Nose normal.      Mouth/Throat:      Lips: Pink.      Mouth: Mucous membranes are moist.      Pharynx: Oropharynx is " clear. Uvula midline.   Eyes:      General: Lids are normal.      Extraocular Movements: Extraocular movements intact.      Pupils: Pupils are equal, round, and reactive to light.   Neck:      Trachea: Trachea and phonation normal.   Cardiovascular:      Rate and Rhythm: Normal rate and regular rhythm.      Pulses: Normal pulses.           Radial pulses are 2+ on the right side and 2+ on the left side.        Dorsalis pedis pulses are 2+ on the right side.        Posterior tibial pulses are 2+ on the right side.      Heart sounds: Normal heart sounds. No murmur heard.  Pulmonary:      Effort: Pulmonary effort is normal.      Breath sounds: Normal breath sounds and air entry. No wheezing or rhonchi.   Abdominal:      General: Abdomen is flat.   Musculoskeletal:         General: Normal range of motion.      Cervical back: Normal range of motion and neck supple. No rigidity or tenderness.      Right lower leg: No edema.      Left lower leg: No edema.      Right foot: Normal range of motion. No deformity, bunion, Charcot foot, foot drop or prominent metatarsal heads.        Feet:    Feet:      Right foot:      Skin integrity: Callus and dry skin present.      Comments: Pain only present with ambulation.  Nontender with palpation.    Lymphadenopathy:      Cervical: No cervical adenopathy.   Skin:     General: Skin is warm and dry.      Capillary Refill: Capillary refill takes less than 2 seconds.   Neurological:      General: No focal deficit present.      Mental Status: She is alert and oriented to person, place, and time. Mental status is at baseline.      GCS: GCS eye subscore is 4. GCS verbal subscore is 5. GCS motor subscore is 6.      Cranial Nerves: Cranial nerves 2-12 are intact. No cranial nerve deficit.      Sensory: Sensation is intact. No sensory deficit.      Motor: Motor function is intact. No weakness.      Coordination: Coordination is intact. Coordination normal.      Gait: Gait is intact. Gait  (Symmetrical.) normal.   Psychiatric:         Attention and Perception: Attention and perception normal.         Mood and Affect: Mood and affect normal.         Speech: Speech normal.         Behavior: Behavior normal. Behavior is cooperative.         Thought Content: Thought content normal.         Cognition and Memory: Cognition and memory normal.         Judgment: Judgment normal.          ASSESSMENT/PLAN:  1. Primary hypertension  Overview:  The patient presents with essential hypertension.  The patient is tolerating the medication well and is in excellent compliance.  The patient is experiencing no side effects.  Counseling was offered regarding low salt diets.  The patient has a reduced salt intake.  The patient denies chest pain, palpitations, shortness of breath, dyspnea on exertion, left or murmur neck pain, nausea, vomiting, diaphoresis, paroxysmal nocturnal dyspnea, and orthopnea.   Hypertension Medications               lisinopriL 10 MG tablet Take 20 mg by mouth once daily.    prazosin (MINIPRESS) 1 MG Cap Take by mouth 2 (two) times daily.        Has been wtihout meds for 5 months -was on lisinopril          Assessment & Plan:  Controlled.  Current blood pressure 122/75.  Patient is compliant with lisinopril 40 mg p.o. once daily and hydrochlorothiazide 12.5 mg p.o. once daily.  Patient is benefitting from blood pressure and she would like to continue with same dose.  Discussed smoke cessation and its affect on blood pressure as well lungs.  Stay physically active and exercise at least 30 minutes a day up to 5 days a week as simple as brisk walking.  Patient to read discharge education materials.  Questions solicited and answered, patient verbalized and agreed to plan.      2. Right foot pain  Assessment & Plan:  Patient stated the incident happens on May 11, 2024 while she was vacuuming the floor.  Patient state she got tangled on the electric Cable and she fell landed on her right knee.  Patient has  showed pictures on her phone from May  2024 incident.  Bruising was noted and swelling as well.  Patient state top of her right foot and the bottom of her foot hurts when she ambulates only.  Patient state that is started about a month ago.  Patient denies any recent fall or trauma.  Discussed x-rays.  Patient declined at this time.  Patient will try medications 1st.  Rx prednisone 20 mg p.o. twice a day for 5 days.  Rx diclofenac 75 mg p.o. b.i.d. as needed for pain.  Take with food and stay hydrated with water.  Do not take with any other NSAIDs such as ibuprofen, Aleve, naproxen, Motrin, aspirins or Advil.  Elevate, rest, ice.  May utilize a frozen bottle water inserted into a sock and massage bottom of feet by rolling the bottle back and forth.  You can ice between 20-30 minutes a day up to 4 days a week as needed for pain or discomfort.  Do the same to top of foot by applying ice as needed.  Read discharge education materials.  Patient to return to clinic if no improvement.  Questions solicited and answered, patient verbalized understanding and agreed to plan.    Orders:  -     diclofenac (VOLTAREN) 75 MG EC tablet; Take 1 tablet (75 mg total) by mouth 2 (two) times daily as needed (pain).  Dispense: 20 tablet; Refill: 1  -     predniSONE (DELTASONE) 20 MG tablet; Take 1 tablet (20 mg total) by mouth 2 (two) times daily. for 5 days  Dispense: 10 tablet; Refill: 0    3. Tobacco user  Overview:  1/2 ppd x 20 yrs with 1 yr cessation    Assessment & Plan:  Smoking Cessation:  Smoker, assistance with smoking cessation was offered including: counseling and printed information on smoking cessation. The patient was counseled regarding smoking and smoking cessation for 3-10 minutes.   Patient declined smoke cessation.      4. Encounter for colorectal cancer screening  -     OCCULT BLOOD FECAL IMMUNOASSAY; Future; Expected date: 08/13/2024    5. Encounter for screening mammogram for breast cancer  -     Mammo Digital  Screening Bilat w/ Papi; Future; Expected date: 08/13/2024    6. Encounter for Papanicolaou smear of cervix  -     Ambulatory referral/consult to Gynecology; Future; Expected date: 08/20/2024           RESULTS:  No results found for this or any previous visit (from the past 1008 hour(s)).      Follow Up:  Follow up in about 6 months (around 2/13/2025).     35 minutes of total time spent on the encounter, which includes face to face time and non-face to face time preparing to see the patient (eg, review of tests), Obtaining and/or reviewing separately obtained history, Documenting clinical information in the electronic or other health record, Independently interpreting results (not separately reported) and communicating results to the patient/family/caregiver, or Care coordination (not separately reported).        Previous medical history/lab work/radiology reviewed and considered during medical management decisions.   Medication list reviewed and medication reconciliation performed.  Patient was provided  and care about his/her current diagnosis (es) and medications including risk/benefit and side effects/adverse events, over the counter medication uses/doses, home self-care and contact precautions,  and red flags and indications for when to seek immediate medical attention.   Patient was advised to continue compliance with current medication list and medical recommendations.  Patient dvised continued compliance with recommended eating habits/ diets for medical conditions and exercise 150 minutes/ week (if possible) for medical condition (s).  Educational handouts and instructions on selected disease management in AVS (After Visit Summary).    All of the patient's questions were answered to patient's satisfaction.   The patient was receptive, expressed verbal understanding and agreement the above plan.          This note was created with the assistance of a voice recognition software or phone dictation. There  may be transcription errors as a result of using this technology however minimal. Effort has been made to assure accuracy of transcription but any obvious errors or omissions should be clarified with the author of the document

## 2024-08-22 ENCOUNTER — TELEPHONE (OUTPATIENT)
Dept: FAMILY MEDICINE | Facility: CLINIC | Age: 49
End: 2024-08-22
Payer: COMMERCIAL

## 2024-08-22 NOTE — TELEPHONE ENCOUNTER
Left message for patient to inform her that mammogram department was trying to get in touch with her. Left mammography's number on voicemail.

## 2024-09-07 ENCOUNTER — HOSPITAL ENCOUNTER (EMERGENCY)
Facility: HOSPITAL | Age: 49
Discharge: HOME | End: 2024-09-07
Attending: STUDENT IN AN ORGANIZED HEALTH CARE EDUCATION/TRAINING PROGRAM
Payer: COMMERCIAL

## 2024-09-07 VITALS
BODY MASS INDEX: 27.31 KG/M2 | SYSTOLIC BLOOD PRESSURE: 114 MMHG | DIASTOLIC BLOOD PRESSURE: 74 MMHG | HEIGHT: 64 IN | WEIGHT: 160 LBS | OXYGEN SATURATION: 98 % | TEMPERATURE: 98 F | HEART RATE: 80 BPM | RESPIRATION RATE: 17 BRPM

## 2024-09-07 DIAGNOSIS — R52 PAIN: ICD-10-CM

## 2024-09-07 DIAGNOSIS — R20.2 PARESTHESIAS: Primary | ICD-10-CM

## 2024-09-07 LAB
ALBUMIN SERPL-MCNC: 4 G/DL (ref 3.5–5)
ALBUMIN/GLOB SERPL: 1.3 RATIO (ref 1.1–2)
ALP SERPL-CCNC: 73 UNIT/L (ref 40–150)
ALT SERPL-CCNC: 13 UNIT/L (ref 0–55)
AMPHET UR QL SCN: POSITIVE
ANION GAP SERPL CALC-SCNC: 8 MEQ/L
AST SERPL-CCNC: 17 UNIT/L (ref 5–34)
BACTERIA #/AREA URNS AUTO: ABNORMAL /HPF
BARBITURATE SCN PRESENT UR: NEGATIVE
BASOPHILS # BLD AUTO: 0.02 X10(3)/MCL
BASOPHILS NFR BLD AUTO: 0.6 %
BENZODIAZ UR QL SCN: NEGATIVE
BILIRUB SERPL-MCNC: 0.3 MG/DL
BILIRUB UR QL STRIP.AUTO: NEGATIVE
BUN SERPL-MCNC: 30.6 MG/DL (ref 7–18.7)
CALCIUM SERPL-MCNC: 9.9 MG/DL (ref 8.4–10.2)
CANNABINOIDS UR QL SCN: NEGATIVE
CHLORIDE SERPL-SCNC: 98 MMOL/L (ref 98–107)
CLARITY UR: ABNORMAL
CO2 SERPL-SCNC: 28 MMOL/L (ref 22–29)
COCAINE UR QL SCN: NEGATIVE
COLOR UR AUTO: ABNORMAL
CREAT SERPL-MCNC: 1.87 MG/DL (ref 0.55–1.02)
CREAT/UREA NIT SERPL: 16
EOSINOPHIL # BLD AUTO: 0.1 X10(3)/MCL (ref 0–0.9)
EOSINOPHIL NFR BLD AUTO: 2.9 %
ERYTHROCYTE [DISTWIDTH] IN BLOOD BY AUTOMATED COUNT: 13.2 % (ref 11.5–17)
FENTANYL UR QL SCN: POSITIVE
GFR SERPLBLD CREATININE-BSD FMLA CKD-EPI: 33 ML/MIN/1.73/M2
GLOBULIN SER-MCNC: 3.1 GM/DL (ref 2.4–3.5)
GLUCOSE SERPL-MCNC: 100 MG/DL (ref 74–100)
GLUCOSE UR QL STRIP: NORMAL
HCT VFR BLD AUTO: 35.4 % (ref 37–47)
HGB BLD-MCNC: 11.5 G/DL (ref 12–16)
HGB UR QL STRIP: NEGATIVE
HYALINE CASTS #/AREA URNS LPF: ABNORMAL /LPF
IMM GRANULOCYTES # BLD AUTO: 0.01 X10(3)/MCL (ref 0–0.04)
IMM GRANULOCYTES NFR BLD AUTO: 0.3 %
KETONES UR QL STRIP: NEGATIVE
LEUKOCYTE ESTERASE UR QL STRIP: 25
LYMPHOCYTES # BLD AUTO: 0.78 X10(3)/MCL (ref 0.6–4.6)
LYMPHOCYTES NFR BLD AUTO: 22.9 %
MAGNESIUM SERPL-MCNC: 2 MG/DL (ref 1.6–2.6)
MCH RBC QN AUTO: 28.3 PG (ref 27–31)
MCHC RBC AUTO-ENTMCNC: 32.5 G/DL (ref 33–36)
MCV RBC AUTO: 87 FL (ref 80–94)
MDMA UR QL SCN: NEGATIVE
MONOCYTES # BLD AUTO: 0.45 X10(3)/MCL (ref 0.1–1.3)
MONOCYTES NFR BLD AUTO: 13.2 %
NEUTROPHILS # BLD AUTO: 2.04 X10(3)/MCL (ref 2.1–9.2)
NEUTROPHILS NFR BLD AUTO: 60.1 %
NITRITE UR QL STRIP: NEGATIVE
NRBC BLD AUTO-RTO: 0 %
OPIATES UR QL SCN: NEGATIVE
PCP UR QL: NEGATIVE
PH UR STRIP: 5.5 [PH]
PH UR: 5.5 [PH] (ref 3–11)
PLATELET # BLD AUTO: 191 X10(3)/MCL (ref 130–400)
PMV BLD AUTO: 9.4 FL (ref 7.4–10.4)
POTASSIUM SERPL-SCNC: 4.8 MMOL/L (ref 3.5–5.1)
PROT SERPL-MCNC: 7.1 GM/DL (ref 6.4–8.3)
PROT UR QL STRIP: NEGATIVE
RBC # BLD AUTO: 4.07 X10(6)/MCL (ref 4.2–5.4)
RBC #/AREA URNS AUTO: ABNORMAL /HPF
SODIUM SERPL-SCNC: 134 MMOL/L (ref 136–145)
SP GR UR STRIP.AUTO: 1.02 (ref 1–1.03)
SPECIFIC GRAVITY, URINE AUTO (.000) (OHS): 1.02 (ref 1–1.03)
SQUAMOUS #/AREA URNS LPF: ABNORMAL /HPF
UROBILINOGEN UR STRIP-ACNC: NORMAL
WBC # BLD AUTO: 3.4 X10(3)/MCL (ref 4.5–11.5)
WBC #/AREA URNS AUTO: ABNORMAL /HPF

## 2024-09-07 PROCEDURE — 96360 HYDRATION IV INFUSION INIT: CPT

## 2024-09-07 PROCEDURE — 99285 EMERGENCY DEPT VISIT HI MDM: CPT | Mod: 25

## 2024-09-07 PROCEDURE — 81001 URINALYSIS AUTO W/SCOPE: CPT | Mod: XB | Performed by: STUDENT IN AN ORGANIZED HEALTH CARE EDUCATION/TRAINING PROGRAM

## 2024-09-07 PROCEDURE — 80053 COMPREHEN METABOLIC PANEL: CPT | Performed by: STUDENT IN AN ORGANIZED HEALTH CARE EDUCATION/TRAINING PROGRAM

## 2024-09-07 PROCEDURE — 63600175 PHARM REV CODE 636 W HCPCS: Performed by: STUDENT IN AN ORGANIZED HEALTH CARE EDUCATION/TRAINING PROGRAM

## 2024-09-07 PROCEDURE — 85025 COMPLETE CBC W/AUTO DIFF WBC: CPT | Performed by: STUDENT IN AN ORGANIZED HEALTH CARE EDUCATION/TRAINING PROGRAM

## 2024-09-07 PROCEDURE — 80307 DRUG TEST PRSMV CHEM ANLYZR: CPT | Performed by: STUDENT IN AN ORGANIZED HEALTH CARE EDUCATION/TRAINING PROGRAM

## 2024-09-07 PROCEDURE — 83735 ASSAY OF MAGNESIUM: CPT | Performed by: STUDENT IN AN ORGANIZED HEALTH CARE EDUCATION/TRAINING PROGRAM

## 2024-09-07 RX ORDER — IBUPROFEN 600 MG/1
600 TABLET ORAL EVERY 6 HOURS PRN
Qty: 20 TABLET | Refills: 0 | Status: SHIPPED | OUTPATIENT
Start: 2024-09-07

## 2024-09-07 RX ORDER — METHOCARBAMOL 500 MG/1
1000 TABLET, FILM COATED ORAL 3 TIMES DAILY
Qty: 30 TABLET | Refills: 0 | Status: SHIPPED | OUTPATIENT
Start: 2024-09-07 | End: 2024-09-12

## 2024-09-07 RX ORDER — GABAPENTIN 100 MG/1
100 CAPSULE ORAL 3 TIMES DAILY
Qty: 21 CAPSULE | Refills: 0 | Status: SHIPPED | OUTPATIENT
Start: 2024-09-07 | End: 2024-09-14

## 2024-09-07 RX ORDER — ONDANSETRON 4 MG/1
4 TABLET, ORALLY DISINTEGRATING ORAL EVERY 6 HOURS PRN
Qty: 12 TABLET | Refills: 0 | Status: SHIPPED | OUTPATIENT
Start: 2024-09-07

## 2024-09-07 RX ADMIN — SODIUM CHLORIDE, POTASSIUM CHLORIDE, SODIUM LACTATE AND CALCIUM CHLORIDE 1000 ML: 600; 310; 30; 20 INJECTION, SOLUTION INTRAVENOUS at 04:09

## 2024-09-07 NOTE — DISCHARGE INSTRUCTIONS

## 2024-09-07 NOTE — ED PROVIDER NOTES
Encounter Date: 2024    SCRIBE #1 NOTE: I, Marija Perez, am scribing for, and in the presence of,  Alberto Beasley MD. I have scribed the following portions of the note - Other sections scribed: HPI, ROS, PE.       History     Chief Complaint   Patient presents with    Extremity Weakness     Pt c/o left leg weakness for an unknown amount of time along with tingling sensation to bilat hands and intermittent numbness to left arm/wrist for days and a feeling that she has a pressure around her mid back radiating to her abdomen, and having right side neck pain, and had a fall several months ago.      Patient is a 48-year-old female with a history of bipolar disorder, depression, HTN, and PTSD presenting to the ED with c/o lower extremity weakness. The pt reports progressively worsening left-sided tightness onset in August after falling. She states that she has seen her PCP for this complaint but has not been diagnosed. She is also reporting neck pain onset following the fall. She denies any numbness.     The history is provided by the patient. No  was used.     Review of patient's allergies indicates:   Allergen Reactions    Morphine     Opioids - morphine analogues      Past Medical History:   Diagnosis Date    Bipolar disorder     Depression     Hypertension     PTSD (post-traumatic stress disorder)     Unspecified viral hepatitis C without hepatic coma      Past Surgical History:   Procedure Laterality Date    BILATERAL TUBAL LIGATION       SECTION      GANGLION CYST EXCISION      HEMORRHOID SURGERY      TONSILLECTOMY      TUBAL LIGATION  10/22/2003     Family History   Problem Relation Name Age of Onset    Rheum arthritis Mother Rosamaria Abdoul     Arthritis Mother Rosamaria Abdoul     Hypertension Mother Rosamaria Abdoul     Rheum arthritis Father      Breast cancer Maternal Grandmother Grandmother Lynne 83    Hypertension Maternal Grandmother Grandmother Lynne     Hypertension Maternal  Grandfather      Hypertension Paternal Grandfather      Cancer Maternal Aunt Aunt Greta      Social History     Tobacco Use    Smoking status: Every Day     Current packs/day: 0.50     Average packs/day: 0.5 packs/day for 18.1 years (9.0 ttl pk-yrs)     Types: Cigarettes     Start date: 8/13/2006    Smokeless tobacco: Never   Substance Use Topics    Alcohol use: Not Currently    Drug use: Never     Review of Systems   Constitutional:  Negative for chills and fever.   HENT:  Negative for congestion, drooling and sore throat.    Eyes:  Negative for pain and visual disturbance.   Respiratory:  Negative for chest tightness, shortness of breath and wheezing.    Cardiovascular:  Negative for chest pain, palpitations and leg swelling.   Gastrointestinal:  Negative for abdominal pain, nausea and vomiting.   Genitourinary:  Negative for dysuria and hematuria.   Musculoskeletal:  Positive for neck pain. Negative for back pain and neck stiffness.        Left-sided muscular tightness.   Skin:  Negative for pallor and rash.   Neurological:  Negative for weakness and numbness.   Hematological:  Does not bruise/bleed easily.       Physical Exam     Initial Vitals [09/07/24 0216]   BP Pulse Resp Temp SpO2   118/70 (!) 119 (!) 22 97.9 °F (36.6 °C) 99 %      MAP       --         Physical Exam    Nursing note and vitals reviewed.  Constitutional: She appears well-developed and well-nourished. She is not diaphoretic. No distress.   HENT:   Head: Normocephalic and atraumatic.   Nose: Nose normal.   Mouth/Throat: Oropharynx is clear and moist.   Eyes: EOM are normal. Pupils are equal, round, and reactive to light.   Neck: Neck supple.   Normal range of motion.  Cardiovascular:  Normal rate and regular rhythm.           No murmur heard.  Pulmonary/Chest: Breath sounds normal. No respiratory distress. She has no wheezes. She has no rales.   Abdominal: Abdomen is soft. She exhibits no distension. There is no abdominal tenderness.    Musculoskeletal:      Cervical back: Normal range of motion and neck supple.      Comments: No midline spinal tenderness.      Neurological: She is alert and oriented to person, place, and time. She has normal strength. No cranial nerve deficit or sensory deficit.   Intact sensation and strength in bilateral upper and lower extremities.    Skin: Skin is warm. Capillary refill takes less than 2 seconds. No rash noted.         ED Course   Procedures  Labs Reviewed   COMPREHENSIVE METABOLIC PANEL - Abnormal       Result Value    Sodium 134 (*)     Potassium 4.8      Chloride 98      CO2 28      Glucose 100      Blood Urea Nitrogen 30.6 (*)     Creatinine 1.87 (*)     Calcium 9.9      Protein Total 7.1      Albumin 4.0      Globulin 3.1      Albumin/Globulin Ratio 1.3      Bilirubin Total 0.3      ALP 73      ALT 13      AST 17      eGFR 33      Anion Gap 8.0      BUN/Creatinine Ratio 16     URINALYSIS, REFLEX TO URINE CULTURE - Abnormal    Color, UA Light-Yellow      Appearance, UA Turbid (*)     Specific Gravity, UA 1.016      pH, UA 5.5      Protein, UA Negative      Glucose, UA Normal      Ketones, UA Negative      Blood, UA Negative      Bilirubin, UA Negative      Urobilinogen, UA Normal      Nitrites, UA Negative      Leukocyte Esterase, UA 25 (*)     RBC, UA 0-5      WBC, UA 6-10 (*)     Bacteria, UA Many (*)     Squamous Epithelial Cells, UA Moderate (*)     Hyaline Casts, UA 3-5 (*)    DRUG SCREEN, URINE (BEAKER) - Abnormal    Amphetamines, Urine Positive (*)     Barbiturates, Urine Negative      Benzodiazepine, Urine Negative      Cannabinoids, Urine Negative      Cocaine, Urine Negative      Fentanyl, Urine Positive (*)     MDMA, Urine Negative      Opiates, Urine Negative      Phencyclidine, Urine Negative      pH, Urine 5.5      Specific Gravity, Urine Auto 1.016      Narrative:     Cut off concentrations:    Amphetamines - 1000 ng/ml  Barbiturates - 200 ng/ml  Benzodiazepine - 200 ng/ml  Cannabinoids  (THC) - 50 ng/ml  Cocaine - 300 ng/ml  Fentanyl - 1.0 ng/ml  MDMA - 500 ng/ml  Opiates - 300 ng/ml   Phencyclidine (PCP) - 25 ng/ml    Specimen submitted for drug analysis and tested for pH and specific gravity in order to evaluate sample integrity. Suspect tampering if specific gravity is <1.003 and/or pH is not within the range of 4.5 - 8.0  False negatives may result form substances such as bleach added to urine.  False positives may result for the presence of a substance with similar chemical structure to the drug or its metabolite.    This test provides only a PRELIMINARY analytical test result. A more specific alternate chemical method must be used in order to obtain a confirmed analytical result. Gas chromatography/mass spectrometry (GC/MS) is the preferred confirmatory method. Other chemical confirmation methods are available. Clinical consideration and professional judgement should be applied to any drug of abuse test result, particularly when preliminary positive results are used.    Positive results will be confirmed only at the physicians request. Unconfirmed screening results are to be used only for medical purposes (treatment).        CBC WITH DIFFERENTIAL - Abnormal    WBC 3.40 (*)     RBC 4.07 (*)     Hgb 11.5 (*)     Hct 35.4 (*)     MCV 87.0      MCH 28.3      MCHC 32.5 (*)     RDW 13.2      Platelet 191      MPV 9.4      Neut % 60.1      Lymph % 22.9      Mono % 13.2      Eos % 2.9      Basophil % 0.6      Lymph # 0.78      Neut # 2.04 (*)     Mono # 0.45      Eos # 0.10      Baso # 0.02      IG# 0.01      IG% 0.3      NRBC% 0.0     MAGNESIUM - Normal    Magnesium Level 2.00     CBC W/ AUTO DIFFERENTIAL    Narrative:     The following orders were created for panel order CBC auto differential.  Procedure                               Abnormality         Status                     ---------                               -----------         ------                     CBC with Differential[[id number]]        Abnormal            Final result                 Please view results for these tests on the individual orders.          Imaging Results              CT Head Without Contrast (Final result)  Result time 09/07/24 09:17:41      Final result by Gil Mcgowan MD (09/07/24 09:17:41)                   Impression:      No acute intracranial abnormality identified.      Electronically signed by: Gil Mcgowan  Date:    09/07/2024  Time:    09:17               Narrative:    EXAMINATION:  CT HEAD WITHOUT CONTRAST    CLINICAL HISTORY:  Headache, chronic, new features or increased frequency;    TECHNIQUE:  Low dose axial images were obtained through the head.  Coronal and sagittal reformations were also performed. Contrast was not administered.    Automatic exposure control was utilized to reduce the patient's radiation dose.    DLP= 1193    COMPARISON:  None.    FINDINGS:  No acute intracranial hemorrhage, edema or mass. No acute parenchymal abnormality.    There is no hydrocephalus, evidence of herniation or midline shift. The ventricles and sulci are normal.    There is normal gray white differentiation.    The osseous structures are normal.    The mastoid air cells are clear.    The auditory canals are patent bilaterally.    The globes and orbital contents are normal bilaterally.    The visualized maxillary, ethmoid and sphenoid sinuses are clear.                        Preliminary result by Ronnie Underwood MD (09/07/24 04:21:50)                   Impression:    1. No acute intracranial process identified. Details and other findings as noted above.               Narrative:    START OF REPORT:  Technique: CT of the head was performed without intravenous contrast with axial as well as coronal and sagittal images.    Comparison: None.    Dosage Information: Automated Exposure Control was utilized 1193.37 mGy.cm.    Clinical history: (Pt c/o left leg weakness for an unknown amount of time along with tingling  sensation to bilat hands and intermittent numbness to left arm/wrist for days and a feeling that she has a pressure around her mid back radiating to her abdomen, and having right side neck pain, and had a fall several months ago.    Findings:  Hemorrhage: No acute intracranial hemorrhage is seen.  CSF spaces: The ventricles, sulci and basal cisterns all appear somewhat prominent suggesting an element of global cerebral atrophy.  Brain parenchyma: There is preservation of the grey white junction throughout. No acute infarct is identified. Minimal microvascular change is seen in portions of the periventricular and deep white matter tracts.  Cerebellum: Unremarkable.  Vascular: Unremarkable venous sinuses. Subtle atheromatous calcification of the intracranial arteries is seen.  Sella and skull base: The sella appears to be within normal limits for age.  Intracranial calcifications: Incidental note is made of bilateral choroid plexus calcification. Incidental note is made of some pineal region calcification.  Calvarium: No acute linear or depressed skull fracture is seen.    Maxillofacial Structures:  Paranasal sinuses: The visualized paranasal sinuses appear clear with no mucoperiosteal thickening or air fluid levels identified.  Orbits: The orbits appear unremarkable.  Zygomatic arches: The zygomatic arches are intact and unremarkable.  Temporal bones and mastoids: The temporal bones and mastoids appear unremarkable.  TMJ: The mandibular condyles appear normally placed with respect to the mandibular fossa.                                         CT Cervical Spine Without Contrast (Final result)  Result time 09/07/24 09:40:03      Final result by Gil Mcgowan MD (09/07/24 09:40:03)                   Impression:      1. No acute cervical spine fracture dislocation or subluxation is seen. 2. Degenerative changes and other details as above.      Electronically signed by: Gil  Mcgowan  Date:    09/07/2024  Time:    09:40               Narrative:    EXAMINATION:  CT CERVICAL SPINE WITHOUT CONTRAST    CLINICAL HISTORY:  Neck pain, chronic, degenerative changes on xray;    TECHNIQUE:  CT of the cervical spine Without contrast. Sagittal and coronal reconstructions were performed on the source images.    Automatic exposure control was utilized to reduce the patient's radiation dose.    DLP = 1193    COMPARISON:  No prior imaging available for comparison.    FINDINGS:  Lung apices: The visualized lung apices appear unremarkable.Spine:Spinal canal: The spinal canal appears unremarkable.Spinal cord: The spinal cord appears unremarkable.Mineralization: Within normal limits.Rotation: No significant rotation is seen.Scoliosis: No significant scoliosis is seen.Vertebral Fusion: No vertebral fusion is identified.Listhesis: No significant listhesis is identified.Lordosis: The cervical lordosis is maintained.Intervertebral disc spaces: The intervertebral discs are preserved throughout.Osteophytes: Mild multilevel endplate osteophytes are seen.Endplate Sclerosis: No significant endplate sclerosis is seen.Uncovertebral degenerative changes: Mild multilevel uncovertebral joint arthrosis is seen.Facet degenerative changes: Mild multilevel facet degenerative changes are seen.Calcifications: None.Fractures: No acute cervical spine fracture dislocation or subluxation is seen.Miscellaneous:Mastoid air cells: The visualized mastoid air cells appear clear.Soft Tissues: Unremarkable.                        Preliminary result by Ronnie Underwood MD (09/07/24 04:15:33)                   Impression:    1. No acute cervical spine fracture dislocation or subluxation is seen.  2. Degenerative changes and other details as above.               Narrative:    START OF REPORT:  Technique: CT of the cervical spine was performed without intravenous contrast with axial as well as sagittal and coronal images.    Comparison:  None.    Dosage Information: Automated Exposure Control was utilized 1193.37 mGy.cm.    Clinical history: (Pt c/o left leg weakness for an unknown amount of time along with tingling sensation to bilat hands and intermittent numbness to left arm/wrist for days and a feeling that she has a pressure around her mid back radiating to her abdomen, and having right side neck pain, and had a fall several months ago.    Findings:  Lung apices: The visualized lung apices appear unremarkable.  Spine:  Spinal canal: The spinal canal appears unremarkable.  Spinal cord: The spinal cord appears unremarkable.  Mineralization: Within normal limits.  Rotation: No significant rotation is seen.  Scoliosis: No significant scoliosis is seen.  Vertebral Fusion: No vertebral fusion is identified.  Listhesis: No significant listhesis is identified.  Lordosis: The cervical lordosis is maintained.  Intervertebral disc spaces: The intervertebral discs are preserved throughout.  Osteophytes: Mild multilevel endplate osteophytes are seen.  Endplate Sclerosis: No significant endplate sclerosis is seen.  Uncovertebral degenerative changes: Mild multilevel uncovertebral joint arthrosis is seen.  Facet degenerative changes: Mild multilevel facet degenerative changes are seen.  Calcifications: None.  Fractures: No acute cervical spine fracture dislocation or subluxation is seen.    Miscellaneous:  Mastoid air cells: The visualized mastoid air cells appear clear.  Soft Tissues: Unremarkable.                                         X-Ray Chest AP Portable (Final result)  Result time 09/07/24 08:28:12      Final result by Gil Mcgowan MD (09/07/24 08:28:12)                   Impression:      No acute cardiopulmonary process.      Electronically signed by: Gil Mcgowan  Date:    09/07/2024  Time:    08:28               Narrative:    EXAMINATION:  XR CHEST AP PORTABLE    CLINICAL HISTORY:  Pain, unspecified    TECHNIQUE:  Single view of the  chest    COMPARISON:  No prior imaging available for comparison.    FINDINGS:  No focal opacification, pleural effusion, or pneumothorax.    The cardiomediastinal silhouette is within normal limits.    No acute osseous abnormality.                                       Medications   lactated ringers bolus 1,000 mL (0 mLs Intravenous Stopped 9/7/24 0514)     Medical Decision Making  Problems Addressed:  Pain: acute illness or injury  Paresthesias: acute illness or injury    Amount and/or Complexity of Data Reviewed  Labs: ordered. Decision-making details documented in ED Course.  Radiology: ordered and independent interpretation performed. Decision-making details documented in ED Course.    Risk  Prescription drug management.    Differential diagnosis (includes but is not limited to):   Cervical neuropathy, paresthesias, radiculopathy, polysubstance abuse    MDM Narrative  48-year-old female presents for evaluation in an weakness and numbness to multiple extremities those.  She reports having a fall several months ago as well.  X-ray of the left lab CTs reviewed no acute traumatic injury patient asymptomatic while in the emergency department.  She was ambulatory at her baseline with a steady gait.  She is tolerating oral intake.  She states she has follow up scheduled with the vessel PE for further evaluation of symptoms, I have discussed the importance of following up with her PCP for further evaluation & management of his symptoms and likely per the testing including the possibility of MRI if her symptoms persist, patient verbalized understanding of the need for follow up.    Dispo: Discharge    My independent radiology interpretation: as above  Point of care US (independently performed and interpreted):   Decision rules/clinical scoring:     Sepsis Perfusion Assessment:     Amount and/or Complexity of Data Reviewed  Independent historian: none   Summary of history:   External data reviewed: notes from previous ED  visits and notes from clinic visits  Summary of data reviewed: Prior records reviewed  Risk and benefits of testing: discussed   Labs: ordered and reviewed  Radiology: ordered and independent interpretation performed (see above or ED course)  ECG/medicine tests: ordered and independent interpretation performed (see above or ED course)  Discussion of management or test interpretation with external provider(s): none   Summary of discussion:     Risk  OTC medications  Prescription drug management   Shared decision making     Critical Care  none    Data Reviewed/Counseling: I have personally reviewed the patient's vital signs, nursing notes, and other relevant tests, information, and imaging. I had a detailed discussion regarding the historical points, exam findings, and any diagnostic results supporting the discharge diagnosis. I personally performed the history, PE, MDM and procedures as documented above and agree with the scribe's documentation.    Portions of this note were dictated using voice recognition software. Although it was reviewed for accuracy, some inherent voice recognition errors may have occurred and may be present in this document.         Scribe Attestation:   Scribe #1: I performed the above scribed service and the documentation accurately describes the services I performed. I attest to the accuracy of the note.    Attending Attestation:           Physician Attestation for Scribe:  Physician Attestation Statement for Scribe #1: I, Alberto Beasley MD, reviewed documentation, as scribed by Marija Perez in my presence, and it is both accurate and complete.             ED Course as of 09/17/24 1202   Sat Sep 07, 2024   0519 X-Ray Chest AP Portable  Independently visualized/reviewed by me during the ED visit.  - No acute lobar consolidation or PTX []   0556 I have reassessed the patient.  Patient is resting comfortably, no acute distress.  Vital signs stable.  Discussed all results including  incidental findings.  Discussed need for follow up and discussed return precautions.  Answered all questions at this time.  Hemodynamically stable for continued outpatient management. Patient verbalized understanding and agreed to plan.    [MC]      ED Course User Index  [MC] Alberto Beasley MD                           Clinical Impression:  Final diagnoses:  [R52] Pain  [R20.2] Paresthesias (Primary)          ED Disposition Condition    Discharge Stable          ED Prescriptions       Medication Sig Dispense Start Date End Date Auth. Provider    ibuprofen (ADVIL,MOTRIN) 600 MG tablet Take 1 tablet (600 mg total) by mouth every 6 (six) hours as needed for Pain. 20 tablet 2024 -- Alberto Beasley MD    ondansetron (ZOFRAN-ODT) 4 MG TbDL Take 1 tablet (4 mg total) by mouth every 6 (six) hours as needed (Nausea). 12 tablet 2024 -- Alberto Beasley MD    gabapentin (NEURONTIN) 100 MG capsule () Take 1 capsule (100 mg total) by mouth 3 (three) times daily. for 7 days 21 capsule 2024 Alberto Beasley MD    methocarbamoL (ROBAXIN) 500 MG Tab () Take 2 tablets (1,000 mg total) by mouth 3 (three) times daily. for 5 days 30 tablet 2024 Alberto Beasley MD          Follow-up Information       Follow up With Specialties Details Why Contact Info    Kinsey Sadler, JANIE Family Medicine Schedule an appointment as soon as possible for a visit   [address] [facility] [id number]  [phone]      [facility] - Emergency Dept Emergency Medicine   [address] [place] [id number] [phone]             Alberto Beasley MD  24 2098

## 2024-09-10 ENCOUNTER — OFFICE VISIT (OUTPATIENT)
Dept: FAMILY MEDICINE | Facility: CLINIC | Age: 49
End: 2024-09-10
Payer: COMMERCIAL

## 2024-09-10 ENCOUNTER — HOSPITAL ENCOUNTER (OUTPATIENT)
Dept: RADIOLOGY | Facility: HOSPITAL | Age: 49
Discharge: HOME | End: 2024-09-10
Attending: NURSE PRACTITIONER
Payer: COMMERCIAL

## 2024-09-10 VITALS
OXYGEN SATURATION: 100 % | HEART RATE: 87 BPM | WEIGHT: 171 LBS | DIASTOLIC BLOOD PRESSURE: 86 MMHG | HEIGHT: 64 IN | RESPIRATION RATE: 18 BRPM | TEMPERATURE: 98 F | BODY MASS INDEX: 29.19 KG/M2 | SYSTOLIC BLOOD PRESSURE: 123 MMHG

## 2024-09-10 DIAGNOSIS — M54.50 ACUTE LEFT-SIDED LOW BACK PAIN WITHOUT SCIATICA: ICD-10-CM

## 2024-09-10 DIAGNOSIS — Z72.0 TOBACCO USER: Primary | ICD-10-CM

## 2024-09-10 DIAGNOSIS — M79.602 PARESTHESIA AND PAIN OF BOTH UPPER EXTREMITIES: ICD-10-CM

## 2024-09-10 DIAGNOSIS — M79.601 PARESTHESIA AND PAIN OF BOTH UPPER EXTREMITIES: ICD-10-CM

## 2024-09-10 DIAGNOSIS — R20.2 PARESTHESIA AND PAIN OF BOTH UPPER EXTREMITIES: ICD-10-CM

## 2024-09-10 DIAGNOSIS — R25.2 POST-TRAUMATIC SPASTICITY: ICD-10-CM

## 2024-09-10 PROCEDURE — 3079F DIAST BP 80-89 MM HG: CPT | Mod: CPTII,,, | Performed by: NURSE PRACTITIONER

## 2024-09-10 PROCEDURE — 1160F RVW MEDS BY RX/DR IN RCRD: CPT | Mod: CPTII,,, | Performed by: NURSE PRACTITIONER

## 2024-09-10 PROCEDURE — 99215 OFFICE O/P EST HI 40 MIN: CPT | Mod: S$PBB,,, | Performed by: NURSE PRACTITIONER

## 2024-09-10 PROCEDURE — 4010F ACE/ARB THERAPY RXD/TAKEN: CPT | Mod: CPTII,,, | Performed by: NURSE PRACTITIONER

## 2024-09-10 PROCEDURE — 99215 OFFICE O/P EST HI 40 MIN: CPT | Mod: PBBFAC,25 | Performed by: NURSE PRACTITIONER

## 2024-09-10 PROCEDURE — 1159F MED LIST DOCD IN RCRD: CPT | Mod: CPTII,,, | Performed by: NURSE PRACTITIONER

## 2024-09-10 PROCEDURE — 3074F SYST BP LT 130 MM HG: CPT | Mod: CPTII,,, | Performed by: NURSE PRACTITIONER

## 2024-09-10 PROCEDURE — 3008F BODY MASS INDEX DOCD: CPT | Mod: CPTII,,, | Performed by: NURSE PRACTITIONER

## 2024-09-10 PROCEDURE — 72100 X-RAY EXAM L-S SPINE 2/3 VWS: CPT | Mod: TC

## 2024-09-10 NOTE — PROGRESS NOTES
Patient Name: Dahiana Rivera   : 1975  MRN: [id number]     SUBJECTIVE DATA:    CHIEF COMPLAINT:   Dahiana Rivera is a 48 y.o. female who presents to clinic today with Follow-up (ER)    [facility].     HPI:  48-year-old female presents to the clinic accompanied by her fiance requesting evaluation of bilateral upper extremity numbness/tingling and left lower back pain affecting ambulation.  Past medical history of bipolar disorder, depression, HTN, and PTSD     2024:   Rt shoulder:  State she had fell on 2024 at home and landed on her right shoulder.  Patient ended up seeking help by going to emergency department for management.  X-rays reviewed at bedside:  FINDINGS:  The osseous and articular surfaces are unremarkable.  There is no acute fracture, dislocation or arthritic change.  Alignment and position are unremarkable.  There is unremarkable mineralization of the bones.  No soft tissue calcifications identified.     Impression:   No osseous abnormality identified.     Patient was prescribed Robaxin 500 mg p.o. 4 times a day as needed for pain.  Did not  prescription.  Patient continued to have pain right shoulder specifically anterior With palpation.  Discomfort with a full range of motion.  No crepitus, no deformity, no edema, no bruising noted on exam.  Bilateral hand  strong and equal.   Discussed with patient to go and  Robaxin 500 mg and to take as directed.  Discussed precautions.  Patient verbalized  Discussed Rx prednisone 20 mg p.o. b.i.d. for 5 days, take with food and stay hydrated with water.  Discussed diclofenac 75 mg p.o. b.i.d. as needed for pain, take with food, do not take with any other NSAIDs such as naproxen, Aleve, ibuprofen, Motrin, Advil or aspirins.  Discussed physical therapy, patient agreed, referral to [facility] initiated.  Discussed ice, rest.  Return to clinic in 3 months or sooner if no improvement.    Did not complete physical  therapy because they wanted to charge her $35 a visit twice a week.  Started home exercises were given and felt like left shoulder discomfort has subsided.     08/13/2024    Right knee pain/right foot pain:  Patient stated the incident happens on May 11, 2024 while she was vacuuming the floor.  Patient state she got tangled on the electric Cable and she fell landed on her right knee.  Patient has showed pictures on her phone from May  2024 incident.  Bruising was noted and swelling as well.  Patient state top of her right foot and the bottom of her foot hurts when she ambulates only.  Patient state that issue started about a month ago.  Patient denies any recent fall or trauma.  Discussed referral for x-rays.  Patient declined at this time.  Patient will try medications 1st.  Rx prednisone 20 mg p.o. twice a day for 5 days.  Rx diclofenac 75 mg p.o. b.i.d. as needed for pain.  Take with food and stay hydrated with water.  Do not take with any other NSAIDs such as ibuprofen, Aleve, naproxen, Motrin, aspirins or Advil.  Elevate, rest, ice.  May utilize a frozen bottle water inserted into a sock and massage bottom of feet by rolling the bottle back and forth.  You can ice between 20-30 minutes a day up to 4 days a week as needed for pain or discomfort.  Do the same to top of foot by applying ice as needed.  Read discharge education materials.  Patient to return to clinic if no improvement.  Questions solicited and answered, patient verbalized understanding and agreed to plan.    09/10/2024    - Patient state she went to emergency department on September 7, 2024 complaining of left leg weakness and bilateral upper extremity tingling, numbness worse to left than right.  Patient was discharged gabapentin 100 mg p.o. 3 times daily, ibuprofen 600 mg p.o. every 6 hours as needed for pain, Robaxin 1000 mg 3 times daily.  Patient state she did not  Robaxin because it was not given to her.  - Patient state she continued  to have right-sided neck pain from the fall that she sustained on May 11th 2024.  - CT cervical spine was completed as well CT head without contrast during emergency department visit.  - CT head no acute intracranial abnormality identified.  - CT cervical spine:  Lung apices: The visualized lung apices appear unremarkable.Spine:Spinal canal: The spinal canal appears unremarkable.Spinal cord: The spinal cord appears unremarkable.Mineralization: Within normal limits.Rotation: No significant rotation is seen.Scoliosis: No significant scoliosis is seen.Vertebral Fusion: No vertebral fusion is identified.Listhesis: No significant listhesis is identified.Lordosis: The cervical lordosis is maintained.Intervertebral disc spaces: The intervertebral discs are preserved throughout.Osteophytes: Mild multilevel endplate osteophytes are seen.Endplate Sclerosis: No significant endplate sclerosis is seen.Uncovertebral degenerative changes: Mild multilevel uncovertebral joint arthrosis is seen.Facet degenerative changes: Mild multilevel facet degenerative changes are seen.Calcifications: None.Fractures: No acute cervical spine fracture dislocation or subluxation is seen.Miscellaneous:Mastoid air cells: The visualized mastoid air cells appear clear.Soft Tissues: Unremarkable.     Impression:     1. No acute cervical spine fracture dislocation or subluxation is seen. 2. Degenerative changes and other details as above.        Electronically signed by:Gil Mcgowan  Date:                                            09/07/2024  Time:                                           09:40    - Patient state what is bothering her is numbness and tingling is worse to left arm and also now she is having difficulty walking because she is unable to lift her left leg at the knee.  Patient demonstrating a spastic walk.    - Patient state pain to left lower back.  Lumbar x-ray ordered:  There are 5 non rib-bearing lumbar type vertebra.  There is mild  lumbar levoscoliosis with apex at L1-2.  Posterior lumbar alignment is maintained.  Vertebral heights are maintained.  No acute fracture seen.  There is mild multilevel vertebral body spurring and disc space narrowing.  There is mild moderate facet arthrosis in the lower lumbar spine.     Impression:     No acute osseous abnormality or static listhesis.  Mild lumbar spondylosis.        Electronically signed by:Jona Shelton MD  Date:                                            09/10/2024  Time:                                           16:52    - Unable to explain to patient why she is having difficulty ambulating.  - Patient is leaning on her fiance to ambulate.  - Denies left lower extremity numbness or tingling or weakness.  - Discussed referral to Neurology to complete EMG testing as well evaluation.  Referral to Dr. Keron Cavazos initiated.  - Discussed with patient possibly is a psychiatric issue and she need to inform her psychiatric PCP regarding the symptom she is having.  - Continue medications as prescribed.  - Discussed ED precaution.  Questions solicited and answered, patient verbalized and agreed to plan.    Patient denies chest pain, shortness of breath, dyspnea on exertion, palpitations, peripheral edema, abdominal pain, nausea, vomiting, diarrhea, constipation, fatigue, fever, chills, dysuria,  hematuria, dark stools or bloody stools      ALLERGIES:   Review of patient's allergies indicates:   Allergen Reactions    Morphine     Opioids - morphine analogues          ROS:  Review of Systems   Constitutional:         Unsteady gait.  Spastic.   Musculoskeletal:  Positive for back pain (Left-sided.) and neck pain (Right-sided neck pain.).   Neurological:  Positive for tingling.   All other systems reviewed and are negative.        OBJECTIVE DATA:  Vital signs  Vitals:    09/10/24 1146   BP: 123/86   Pulse: 87   Resp: 18   Temp: 98.1 °F (36.7 °C)   TempSrc: Oral   SpO2: 100%   Weight: 77.6 kg (171 lb)  "  Height: 5' 4" (1.626 m)      Body mass index is 29.35 kg/m².    PHYSICAL EXAM:   Physical Exam  Vitals and nursing note reviewed.   Constitutional:       General: She is awake. She is not in acute distress.     Appearance: Normal appearance. She is well-developed, well-groomed and overweight. She is not ill-appearing, toxic-appearing or diaphoretic.   HENT:      Head: Normocephalic and atraumatic.      Right Ear: Tympanic membrane, ear canal and external ear normal.      Left Ear: Tympanic membrane, ear canal and external ear normal.      Nose: Nose normal.      Mouth/Throat:      Lips: Pink.      Mouth: Mucous membranes are moist.      Tongue: Tongue does not deviate from midline.      Pharynx: Oropharynx is clear. Uvula midline.   Eyes:      General: Lids are normal. Gaze aligned appropriately. No scleral icterus.     Extraocular Movements: Extraocular movements intact.      Conjunctiva/sclera: Conjunctivae normal.      Pupils: Pupils are equal, round, and reactive to light.   Neck:      Trachea: Trachea and phonation normal.     Cardiovascular:      Rate and Rhythm: Normal rate and regular rhythm.      Pulses: Normal pulses.           Radial pulses are 2+ on the right side and 2+ on the left side.        Dorsalis pedis pulses are 2+ on the right side and 2+ on the left side.        Posterior tibial pulses are 2+ on the right side and 2+ on the left side.      Heart sounds: Normal heart sounds.   Pulmonary:      Effort: Pulmonary effort is normal.      Breath sounds: Normal breath sounds and air entry.   Abdominal:      General: Abdomen is flat.      Palpations: Abdomen is soft.      Tenderness: There is no abdominal tenderness. There is no right CVA tenderness or left CVA tenderness.   Genitourinary:     Comments: Denies urinary incontinence or bowel habit changes.  Musculoskeletal:         General: Tenderness present. No swelling, deformity or signs of injury. Normal range of motion.      Cervical back: Normal " range of motion and neck supple. No rigidity or tenderness.        Back:       Right lower leg: No edema.      Left lower leg: No edema.   Lymphadenopathy:      Cervical: No cervical adenopathy.   Skin:     General: Skin is warm and dry.      Capillary Refill: Capillary refill takes less than 2 seconds.   Neurological:      General: No focal deficit present.      Mental Status: She is alert and oriented to person, place, and time. Mental status is at baseline.      GCS: GCS eye subscore is 4. GCS verbal subscore is 5. GCS motor subscore is 6.      Cranial Nerves: Cranial nerves 2-12 are intact. No cranial nerve deficit.      Sensory: Sensation is intact. No sensory deficit.      Motor: Motor function is intact. No weakness.      Coordination: Coordination abnormal.      Gait: Gait abnormal.      Comments: 10 g monofilament utilized to test sensation to bilateral upper extremities.  Shoulders, humerus, forearms  Patient report almost feels the same.  Bilateral hand  strong and equal.  Unsteady gait, spastic left lower extremity.   Psychiatric:         Attention and Perception: Attention and perception normal.         Mood and Affect: Mood and affect normal.         Speech: Speech normal.         Behavior: Behavior normal. Behavior is cooperative.         Thought Content: Thought content normal.         Cognition and Memory: Cognition and memory normal.         Judgment: Judgment normal.          ASSESSMENT/PLAN:  1. Tobacco user  Overview:  1/2 ppd x 20 yrs with 1 yr cessation    Assessment & Plan:  Patient agreed to be referred to smoke cessation counseling.  Referral initiated.    Orders:  -     Ambulatory referral/consult to Smoking Cessation Program; Future; Expected date: 09/17/2024    2. Paresthesia and pain of both upper extremities  Assessment & Plan:  - Patient state she went to emergency department on September 7, 2024 complaining of left leg weakness and bilateral upper extremity tingling, numbness  worse to left than right.  Patient was discharged gabapentin 100 mg p.o. 3 times daily, ibuprofen 600 mg p.o. every 6 hours as needed for pain, Robaxin 1000 mg 3 times daily.  Patient state she did not  Robaxin because it was not given to her.  - Patient state she continued to have right-sided neck pain from the fall that she sustained on May 11th 2024.  - CT cervical spine was completed as well CT head without contrast during emergency department visit.  - CT head no acute intracranial abnormality identified.  - CT cervical spine:  Lung apices: The visualized lung apices appear unremarkable.Spine:Spinal canal: The spinal canal appears unremarkable.Spinal cord: The spinal cord appears unremarkable.Mineralization: Within normal limits.Rotation: No significant rotation is seen.Scoliosis: No significant scoliosis is seen.Vertebral Fusion: No vertebral fusion is identified.Listhesis: No significant listhesis is identified.Lordosis: The cervical lordosis is maintained.Intervertebral disc spaces: The intervertebral discs are preserved throughout.Osteophytes: Mild multilevel endplate osteophytes are seen.Endplate Sclerosis: No significant endplate sclerosis is seen.Uncovertebral degenerative changes: Mild multilevel uncovertebral joint arthrosis is seen.Facet degenerative changes: Mild multilevel facet degenerative changes are seen.Calcifications: None.Fractures: No acute cervical spine fracture dislocation or subluxation is seen.Miscellaneous:Mastoid air cells: The visualized mastoid air cells appear clear.Soft Tissues: Unremarkable.     Impression:     1. No acute cervical spine fracture dislocation or subluxation is seen. 2. Degenerative changes and other details as above.        Electronically signed by:Gil Mcgowan  Date:                                            09/07/2024  Time:                                           09:40    - Patient state what is bothering her is numbness and tingling is worse to  left arm and also now she is having difficulty walking because she is unable to lift her left leg at the knee.  Patient demonstrating a spastic walk.    - Patient state pain to left lower back.  Lumbar x-ray ordered:  There are 5 non rib-bearing lumbar type vertebra.  There is mild lumbar levoscoliosis with apex at L1-2.  Posterior lumbar alignment is maintained.  Vertebral heights are maintained.  No acute fracture seen.  There is mild multilevel vertebral body spurring and disc space narrowing.  There is mild moderate facet arthrosis in the lower lumbar spine.     Impression:     No acute osseous abnormality or static listhesis.  Mild lumbar spondylosis.        Electronically signed by:Jona Shelton MD  Date:                                            09/10/2024  Time:                                           16:52    - Unable to explain to patient why she is having difficulty ambulating.  - Patient is leaning on her fiance to ambulate.  - Denies left lower extremity numbness or tingling or weakness.  - Discussed referral to Neurology to complete EMG testing as well evaluation.  Referral to Dr. Keron Cavazos initiated.  - Discussed with patient possibly is a psychiatric issue and she need to inform her psychiatric PCP regarding the symptom she is having.  - Continue medications as prescribed.  - Discussed ED precaution.  Questions solicited and answered, patient verbalized and agreed to plan.      Orders:  -     Ambulatory referral/consult to Neurology; Future; Expected date: 09/17/2024    3. Acute left-sided low back pain without sciatica  Assessment & Plan:  - Patient state she went to emergency department on September 7, 2024 complaining of left leg weakness and bilateral upper extremity tingling, numbness worse to left than right.  Patient was discharged gabapentin 100 mg p.o. 3 times daily, ibuprofen 600 mg p.o. every 6 hours as needed for pain, Robaxin 1000 mg 3 times daily.  Patient state she did not   Robaxin because it was not given to her.  - Patient state she continued to have right-sided neck pain from the fall that she sustained on May 11th 2024.  - CT cervical spine was completed as well CT head without contrast during emergency department visit.  - CT head no acute intracranial abnormality identified.  - CT cervical spine:  Lung apices: The visualized lung apices appear unremarkable.Spine:Spinal canal: The spinal canal appears unremarkable.Spinal cord: The spinal cord appears unremarkable.Mineralization: Within normal limits.Rotation: No significant rotation is seen.Scoliosis: No significant scoliosis is seen.Vertebral Fusion: No vertebral fusion is identified.Listhesis: No significant listhesis is identified.Lordosis: The cervical lordosis is maintained.Intervertebral disc spaces: The intervertebral discs are preserved throughout.Osteophytes: Mild multilevel endplate osteophytes are seen.Endplate Sclerosis: No significant endplate sclerosis is seen.Uncovertebral degenerative changes: Mild multilevel uncovertebral joint arthrosis is seen.Facet degenerative changes: Mild multilevel facet degenerative changes are seen.Calcifications: None.Fractures: No acute cervical spine fracture dislocation or subluxation is seen.Miscellaneous:Mastoid air cells: The visualized mastoid air cells appear clear.Soft Tissues: Unremarkable.     Impression:     1. No acute cervical spine fracture dislocation or subluxation is seen. 2. Degenerative changes and other details as above.        Electronically signed by:Gil Mcgowan  Date:                                            09/07/2024  Time:                                           09:40    - Patient state what is bothering her is numbness and tingling is worse to left arm and also now she is having difficulty walking because she is unable to lift her left leg at the knee.  Patient demonstrating a spastic walk.    - Patient state pain to left lower back.  Lumbar x-ray  ordered:  There are 5 non rib-bearing lumbar type vertebra.  There is mild lumbar levoscoliosis with apex at L1-2.  Posterior lumbar alignment is maintained.  Vertebral heights are maintained.  No acute fracture seen.  There is mild multilevel vertebral body spurring and disc space narrowing.  There is mild moderate facet arthrosis in the lower lumbar spine.     Impression:     No acute osseous abnormality or static listhesis.  Mild lumbar spondylosis.        Electronically signed by:Jona Shelton MD  Date:                                            09/10/2024  Time:                                           16:52    - Unable to explain to patient why she is having difficulty ambulating.  - Patient is leaning on her fiance to ambulate.  - Denies left lower extremity numbness or tingling or weakness.  - Discussed referral to Neurology to complete EMG testing as well evaluation.  Referral to Dr. Keron Cavazos initiated.  - Discussed with patient possibly is a psychiatric issue and she need to inform her psychiatric PCP regarding the symptom she is having.  - Continue medications as prescribed.  - Discussed ED precaution.  Questions solicited and answered, patient verbalized and agreed to plan.      Orders:  -     X-Ray Lumbar Spine 2 Or 3 Views; Future; Expected date: 09/10/2024    4. Post-traumatic spasticity  Assessment & Plan:  - Patient state she went to emergency department on September 7, 2024 complaining of left leg weakness and bilateral upper extremity tingling, numbness worse to left than right.  Patient was discharged gabapentin 100 mg p.o. 3 times daily, ibuprofen 600 mg p.o. every 6 hours as needed for pain, Robaxin 1000 mg 3 times daily.  Patient state she did not  Robaxin because it was not given to her.  - Patient state she continued to have right-sided neck pain from the fall that she sustained on May 11th 2024.  - CT cervical spine was completed as well CT head without contrast during emergency  department visit.  - CT head no acute intracranial abnormality identified.  - CT cervical spine:  Lung apices: The visualized lung apices appear unremarkable.Spine:Spinal canal: The spinal canal appears unremarkable.Spinal cord: The spinal cord appears unremarkable.Mineralization: Within normal limits.Rotation: No significant rotation is seen.Scoliosis: No significant scoliosis is seen.Vertebral Fusion: No vertebral fusion is identified.Listhesis: No significant listhesis is identified.Lordosis: The cervical lordosis is maintained.Intervertebral disc spaces: The intervertebral discs are preserved throughout.Osteophytes: Mild multilevel endplate osteophytes are seen.Endplate Sclerosis: No significant endplate sclerosis is seen.Uncovertebral degenerative changes: Mild multilevel uncovertebral joint arthrosis is seen.Facet degenerative changes: Mild multilevel facet degenerative changes are seen.Calcifications: None.Fractures: No acute cervical spine fracture dislocation or subluxation is seen.Miscellaneous:Mastoid air cells: The visualized mastoid air cells appear clear.Soft Tissues: Unremarkable.     Impression:     1. No acute cervical spine fracture dislocation or subluxation is seen. 2. Degenerative changes and other details as above.        Electronically signed by:Gil Mcgowan  Date:                                            09/07/2024  Time:                                           09:40    - Patient state what is bothering her is numbness and tingling is worse to left arm and also now she is having difficulty walking because she is unable to lift her left leg at the knee.  Patient demonstrating a spastic walk.    - Patient state pain to left lower back.  Lumbar x-ray ordered:  There are 5 non rib-bearing lumbar type vertebra.  There is mild lumbar levoscoliosis with apex at L1-2.  Posterior lumbar alignment is maintained.  Vertebral heights are maintained.  No acute fracture seen.  There is mild multilevel  vertebral body spurring and disc space narrowing.  There is mild moderate facet arthrosis in the lower lumbar spine.     Impression:     No acute osseous abnormality or static listhesis.  Mild lumbar spondylosis.        Electronically signed by:Jona Shelton MD  Date:                                            09/10/2024  Time:                                           16:52    - Unable to explain to patient why she is having difficulty ambulating.  - Patient is leaning on her fiance to ambulate.  - Denies left lower extremity numbness or tingling or weakness.  - Discussed referral to Neurology to complete EMG testing as well evaluation.  Referral to Dr. Keron Cavazos initiated.  - Discussed with patient possibly is a psychiatric issue and she need to inform her psychiatric PCP regarding the symptom she is having.  - Continue medications as prescribed.  - Discussed ED precaution.  Questions solicited and answered, patient verbalized and agreed to plan.               RESULTS:  Recent Results (from the past 1008 hour(s))   Comprehensive metabolic panel    Collection Time: 09/07/24  3:41 AM   Result Value Ref Range    Sodium 134 (L) 136 - 145 mmol/L    Potassium 4.8 3.5 - 5.1 mmol/L    Chloride 98 98 - 107 mmol/L    CO2 28 22 - 29 mmol/L    Glucose 100 74 - 100 mg/dL    Blood Urea Nitrogen 30.6 (H) 7.0 - 18.7 mg/dL    Creatinine 1.87 (H) 0.55 - 1.02 mg/dL    Calcium 9.9 8.4 - 10.2 mg/dL    Protein Total 7.1 6.4 - 8.3 gm/dL    Albumin 4.0 3.5 - 5.0 g/dL    Globulin 3.1 2.4 - 3.5 gm/dL    Albumin/Globulin Ratio 1.3 1.1 - 2.0 ratio    Bilirubin Total 0.3 <=1.5 mg/dL    ALP 73 40 - 150 unit/L    ALT 13 0 - 55 unit/L    AST 17 5 - 34 unit/L    eGFR 33 mL/min/1.73/m2    Anion Gap 8.0 mEq/L    BUN/Creatinine Ratio 16    Magnesium    Collection Time: 09/07/24  3:41 AM   Result Value Ref Range    Magnesium Level 2.00 1.60 - 2.60 mg/dL   CBC with Differential    Collection Time: 09/07/24  3:41 AM   Result Value Ref Range    WBC  3.40 (L) 4.50 - 11.50 x10(3)/mcL    RBC 4.07 (L) 4.20 - 5.40 x10(6)/mcL    Hgb 11.5 (L) 12.0 - 16.0 g/dL    Hct 35.4 (L) 37.0 - 47.0 %    MCV 87.0 80.0 - 94.0 fL    MCH 28.3 27.0 - 31.0 pg    MCHC 32.5 (L) 33.0 - 36.0 g/dL    RDW 13.2 11.5 - 17.0 %    Platelet 191 130 - 400 x10(3)/mcL    MPV 9.4 7.4 - 10.4 fL    Neut % 60.1 %    Lymph % 22.9 %    Mono % 13.2 %    Eos % 2.9 %    Basophil % 0.6 %    Lymph # 0.78 0.6 - 4.6 x10(3)/mcL    Neut # 2.04 (L) 2.1 - 9.2 x10(3)/mcL    Mono # 0.45 0.1 - 1.3 x10(3)/mcL    Eos # 0.10 0 - 0.9 x10(3)/mcL    Baso # 0.02 <=0.2 x10(3)/mcL    IG# 0.01 0 - 0.04 x10(3)/mcL    IG% 0.3 %    NRBC% 0.0 %   Urinalysis, Reflex to Urine Culture    Collection Time: 09/07/24  4:15 AM    Specimen: Urine   Result Value Ref Range    Color, UA Light-Yellow Yellow, Light-Yellow, Colorless, Straw, Dark-Yellow    Appearance, UA Turbid (A) Clear    Specific Gravity, UA 1.016 1.005 - 1.030    pH, UA 5.5 5.0 - 8.5    Protein, UA Negative Negative    Glucose, UA Normal Negative, Normal    Ketones, UA Negative Negative    Blood, UA Negative Negative    Bilirubin, UA Negative Negative    Urobilinogen, UA Normal 0.2, 1.0, Normal    Nitrites, UA Negative Negative    Leukocyte Esterase, UA 25 (A) Negative    RBC, UA 0-5 None Seen, 0-2, 3-5, 0-5 /HPF    WBC, UA 6-10 (A) None Seen, 0-2, 3-5, 0-5 /HPF    Bacteria, UA Many (A) None Seen, Trace /HPF    Squamous Epithelial Cells, UA Moderate (A) None Seen, Trace, Rare /HPF    Hyaline Casts, UA 3-5 (A) None Seen /lpf   Drug Screen, Urine    Collection Time: 09/07/24  4:15 AM   Result Value Ref Range    Amphetamines, Urine Positive (A) Negative    Barbiturates, Urine Negative Negative    Benzodiazepine, Urine Negative Negative    Cannabinoids, Urine Negative Negative    Cocaine, Urine Negative Negative    Fentanyl, Urine Positive (A) Negative    MDMA, Urine Negative Negative    Opiates, Urine Negative Negative    Phencyclidine, Urine Negative Negative    pH, Urine 5.5  3.0 - 11.0    Specific Gravity, Urine Auto 1.016 1.001 - 1.035         Follow Up:  No follow-ups on file.     40 minutes of total time spent on the encounter, which includes face to face time and non-face to face time preparing to see the patient (eg, review of tests), Obtaining and/or reviewing separately obtained history, Documenting clinical information in the electronic or other health record, Independently interpreting results (not separately reported) and communicating results to the patient/family/caregiver, or Care coordination (not separately reported).      Previous medical history/lab work/radiology reviewed and considered during medical management decisions.   Medication list reviewed and medication reconciliation performed.  Patient was provided  and care about his/her current diagnosis (es) and medications including risk/benefit and side effects/adverse events, over the counter medication uses/doses, home self-care and contact precautions,  and red flags and indications for when to seek immediate medical attention.   Patient was advised to continue compliance with current medication list and medical recommendations.  Patient dvised continued compliance with recommended eating habits/ diets for medical conditions and exercise 150 minutes/ week (if possible) for medical condition (s).  Educational handouts and instructions on selected disease management in AVS (After Visit Summary).    All of the patient's questions were answered to patient's satisfaction.   The patient was receptive, expressed verbal understanding and agreement the above plan.        This note was created with the assistance of a voice recognition software or phone dictation. There may be transcription errors as a result of using this technology however minimal. Effort has been made to assure accuracy of transcription but any obvious errors or omissions should be clarified with the author of the document

## 2024-09-10 NOTE — PROGRESS NOTES
PLEASE CALL PATIENTS WITH RESULT  Mild lumbar spondylosis means it is a degenerative condition that effects the lower spine.  Neurology referral initiated.  If Symptom worsens please go to nearest emergency department for evaluation.

## 2024-09-10 NOTE — ASSESSMENT & PLAN NOTE
- Patient state she went to emergency department on September 7, 2024 complaining of left leg weakness and bilateral upper extremity tingling, numbness worse to left than right.  Patient was discharged gabapentin 100 mg p.o. 3 times daily, ibuprofen 600 mg p.o. every 6 hours as needed for pain, Robaxin 1000 mg 3 times daily.  Patient state she did not  Robaxin because it was not given to her.  - Patient state she continued to have right-sided neck pain from the fall that she sustained on May 11th 2024.  - CT cervical spine was completed as well CT head without contrast during emergency department visit.  - CT head no acute intracranial abnormality identified.  - CT cervical spine:  Lung apices: The visualized lung apices appear unremarkable.Spine:Spinal canal: The spinal canal appears unremarkable.Spinal cord: The spinal cord appears unremarkable.Mineralization: Within normal limits.Rotation: No significant rotation is seen.Scoliosis: No significant scoliosis is seen.Vertebral Fusion: No vertebral fusion is identified.Listhesis: No significant listhesis is identified.Lordosis: The cervical lordosis is maintained.Intervertebral disc spaces: The intervertebral discs are preserved throughout.Osteophytes: Mild multilevel endplate osteophytes are seen.Endplate Sclerosis: No significant endplate sclerosis is seen.Uncovertebral degenerative changes: Mild multilevel uncovertebral joint arthrosis is seen.Facet degenerative changes: Mild multilevel facet degenerative changes are seen.Calcifications: None.Fractures: No acute cervical spine fracture dislocation or subluxation is seen.Miscellaneous:Mastoid air cells: The visualized mastoid air cells appear clear.Soft Tissues: Unremarkable.     Impression:     1. No acute cervical spine fracture dislocation or subluxation is seen. 2. Degenerative changes and other details as above.        Electronically signed by:Gil Mcgowan  Date:                                             09/07/2024  Time:                                           09:40    - Patient state what is bothering her is numbness and tingling is worse to left arm and also now she is having difficulty walking because she is unable to lift her left leg at the knee.  Patient demonstrating a spastic walk.    - Patient state pain to left lower back.  Lumbar x-ray ordered:  There are 5 non rib-bearing lumbar type vertebra.  There is mild lumbar levoscoliosis with apex at L1-2.  Posterior lumbar alignment is maintained.  Vertebral heights are maintained.  No acute fracture seen.  There is mild multilevel vertebral body spurring and disc space narrowing.  There is mild moderate facet arthrosis in the lower lumbar spine.     Impression:     No acute osseous abnormality or static listhesis.  Mild lumbar spondylosis.        Electronically signed by:Jona Shelton MD  Date:                                            09/10/2024  Time:                                           16:52    - Unable to explain to patient why she is having difficulty ambulating.  - Patient is leaning on her fiance to ambulate.  - Denies left lower extremity numbness or tingling or weakness.  - Discussed referral to Neurology to complete EMG testing as well evaluation.  Referral to Dr. Keron Cavazos initiated.  - Discussed with patient possibly is a psychiatric issue and she need to inform her psychiatric PCP regarding the symptom she is having.  - Continue medications as prescribed.  - Discussed ED precaution.  Questions solicited and answered, patient verbalized and agreed to plan.

## 2024-09-11 ENCOUNTER — PATIENT MESSAGE (OUTPATIENT)
Dept: FAMILY MEDICINE | Facility: CLINIC | Age: 49
End: 2024-09-11
Payer: COMMERCIAL

## 2024-09-16 ENCOUNTER — TELEPHONE (OUTPATIENT)
Dept: FAMILY MEDICINE | Facility: CLINIC | Age: 49
End: 2024-09-16
Payer: COMMERCIAL

## 2024-09-16 NOTE — TELEPHONE ENCOUNTER
----- Message from ERICA Robles sent at 9/10/2024  5:40 PM CDT -----  PLEASE CALL PATIENTS WITH RESULT  Mild lumbar spondylosis means it is a degenerative condition that effects the lower spine.  Neurology referral initiated.  If Symptom worsens please go to nearest emergency department for evaluation.

## 2024-09-17 ENCOUNTER — TELEPHONE (OUTPATIENT)
Dept: FAMILY MEDICINE | Facility: CLINIC | Age: 49
End: 2024-09-17
Payer: COMMERCIAL

## 2024-09-17 NOTE — TELEPHONE ENCOUNTER
Called patient to give results. Patient verbalized understanding. No additional questions at this time.     ----- Message from ERICA Robles sent at 9/10/2024  5:40 PM CDT -----  PLEASE CALL PATIENTS WITH RESULT  Mild lumbar spondylosis means it is a degenerative condition that effects the lower spine.  Neurology referral initiated.  If Symptom worsens please go to nearest emergency department for evaluation.

## 2024-09-26 ENCOUNTER — PATIENT MESSAGE (OUTPATIENT)
Dept: FAMILY MEDICINE | Facility: CLINIC | Age: 49
End: 2024-09-26
Payer: COMMERCIAL

## 2024-10-08 NOTE — TELEPHONE ENCOUNTER
Did we find out regarding neurology referral.  Patient needs to have an appointment to discuss issue she is having.

## 2024-10-09 ENCOUNTER — TELEPHONE (OUTPATIENT)
Dept: FAMILY MEDICINE | Facility: CLINIC | Age: 49
End: 2024-10-09
Payer: COMMERCIAL

## 2024-10-09 NOTE — TELEPHONE ENCOUNTER
Advised patient through portal    ----- Message from ERICA Sainz sent at 9/24/2024  1:14 PM CDT -----  Regarding: Colorectal cancer screening  Please advise patient to complete colorectal cancer screening.  If you have received Cologuard please complete and sent back to company.  If you have received occult fecal immunoassay sample please complete and bring back to clinic.  Thank you

## 2024-10-15 PROBLEM — G82.50 SPASTIC QUADRIPARESIS: Status: ACTIVE | Noted: 2024-10-15

## 2024-10-18 ENCOUNTER — TELEPHONE (OUTPATIENT)
Dept: FAMILY MEDICINE | Facility: CLINIC | Age: 49
End: 2024-10-18
Payer: COMMERCIAL

## 2024-10-18 NOTE — TELEPHONE ENCOUNTER
Patient states her MRI is scheduled for Tuesday 10/22/2024.    ----- Message from ERICA Sainz sent at 10/15/2024  4:11 PM CDT -----  Regarding: MRI of C-spine without contrast  Please inform patient results was received from Dr. Elena.  Physician requested for her to get an  MRI of C-spine without contrast  Please let us work on getting this patient scheduled for MRI of C-spine without contrast this week.  Thanks

## 2024-10-19 DIAGNOSIS — I10 PRIMARY HYPERTENSION: ICD-10-CM

## 2024-10-21 RX ORDER — LISINOPRIL 40 MG/1
40 TABLET ORAL
Qty: 90 TABLET | Refills: 3 | Status: SHIPPED | OUTPATIENT
Start: 2024-10-21

## 2024-10-21 RX ORDER — HYDROCHLOROTHIAZIDE 12.5 MG/1
12.5 TABLET ORAL
Qty: 90 TABLET | Refills: 3 | Status: SHIPPED | OUTPATIENT
Start: 2024-10-21

## 2024-10-24 PROBLEM — E04.1 THYROID NODULE: Status: ACTIVE | Noted: 2024-10-24

## 2024-10-25 ENCOUNTER — TELEPHONE (OUTPATIENT)
Dept: FAMILY MEDICINE | Facility: CLINIC | Age: 49
End: 2024-10-25
Payer: COMMERCIAL

## 2024-10-25 NOTE — TELEPHONE ENCOUNTER
Called patient to give results. Patient verbalized understanding. No additional questions at this time.         ----- Message from ERICA Sainz sent at 10/24/2024  4:19 PM CDT -----  PLEASE CALL PATIENTS WITH RESULT  Possible compression to thoracic vertebra it 3.  Disc space in the neck is narrow which the spinal nerves exit becomes constricted potentially compressing on the nerves.  Cervical vertebra it C3-C4 shows bone spurs and disc materials are pushing and gaze and flattening the spinal cord which can lead to significant neurological problem this is noted throughout your cervical spine.    Neurosurgery referral to [place] has been initiated.  You should be expecting a phone call to schedule appointment.  In the meantime if symptom worsens please go to nearest emergency department for management.    Also it was noted coincidental finding. thyroid nodule left thyroid lobe and they have suggested to send you for an ultrasound for your thyroid.  Ultrasound of thyroid has been initiated and you should expect a phone call from radiology to schedule.    Please if you have any questions or concerns give us a call back.

## 2024-11-03 ENCOUNTER — HOSPITAL ENCOUNTER (EMERGENCY)
Facility: HOSPITAL | Age: 49
Discharge: HOME | End: 2024-11-03
Attending: INTERNAL MEDICINE
Payer: COMMERCIAL

## 2024-11-03 VITALS
BODY MASS INDEX: 29.19 KG/M2 | OXYGEN SATURATION: 100 % | RESPIRATION RATE: 18 BRPM | HEIGHT: 64 IN | SYSTOLIC BLOOD PRESSURE: 126 MMHG | DIASTOLIC BLOOD PRESSURE: 99 MMHG | WEIGHT: 171 LBS | TEMPERATURE: 99 F | HEART RATE: 102 BPM
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
## 2024-11-03 DIAGNOSIS — M54.2 CHRONIC NECK PAIN: ICD-10-CM

## 2024-11-03 DIAGNOSIS — M62.838 MUSCLE SPASM: ICD-10-CM

## 2024-11-03 DIAGNOSIS — G89.29 CHRONIC NECK PAIN: ICD-10-CM

## 2024-11-03 LAB
ALBUMIN SERPL-MCNC: 4.7 G/DL (ref 3.5–5)
ALBUMIN/GLOB SERPL: 1.2 RATIO (ref 1.1–2)
ALP SERPL-CCNC: 79 UNIT/L (ref 40–150)
ALT SERPL-CCNC: 20 UNIT/L (ref 0–55)
AMPHET UR QL SCN: NEGATIVE
ANION GAP SERPL CALC-SCNC: 15 MEQ/L
AST SERPL-CCNC: 20 UNIT/L (ref 5–34)
BARBITURATE SCN PRESENT UR: NEGATIVE
BASOPHILS # BLD AUTO: 0.05 X10(3)/MCL
BASOPHILS NFR BLD AUTO: 0.5 %
BENZODIAZ UR QL SCN: NEGATIVE
BILIRUB SERPL-MCNC: 0.8 MG/DL
BILIRUB UR QL STRIP.AUTO: NEGATIVE
BUN SERPL-MCNC: 20.2 MG/DL (ref 7–18.7)
CALCIUM SERPL-MCNC: 10.7 MG/DL (ref 8.4–10.2)
CANNABINOIDS UR QL SCN: NEGATIVE
CHLORIDE SERPL-SCNC: 101 MMOL/L (ref 98–107)
CK SERPL-CCNC: 189 U/L (ref 29–168)
CLARITY UR: CLEAR
CO2 SERPL-SCNC: 23 MMOL/L (ref 22–29)
COCAINE UR QL SCN: NEGATIVE
COLOR UR AUTO: NORMAL
CREAT SERPL-MCNC: 1.21 MG/DL (ref 0.55–1.02)
CREAT/UREA NIT SERPL: 17
EOSINOPHIL # BLD AUTO: 0.02 X10(3)/MCL (ref 0–0.9)
EOSINOPHIL NFR BLD AUTO: 0.2 %
ERYTHROCYTE [DISTWIDTH] IN BLOOD BY AUTOMATED COUNT: 13.6 % (ref 11.5–17)
FENTANYL UR QL SCN: POSITIVE
GFR SERPLBLD CREATININE-BSD FMLA CKD-EPI: 55 ML/MIN/1.73/M2
GLOBULIN SER-MCNC: 3.8 GM/DL (ref 2.4–3.5)
GLUCOSE SERPL-MCNC: 103 MG/DL (ref 74–100)
GLUCOSE UR QL STRIP: NEGATIVE
HCT VFR BLD AUTO: 41.5 % (ref 37–47)
HGB BLD-MCNC: 13.8 G/DL (ref 12–16)
HGB UR QL STRIP: NEGATIVE
IMM GRANULOCYTES # BLD AUTO: 0.03 X10(3)/MCL (ref 0–0.04)
IMM GRANULOCYTES NFR BLD AUTO: 0.3 %
KETONES UR QL STRIP: NEGATIVE
LEUKOCYTE ESTERASE UR QL STRIP: NEGATIVE
LYMPHOCYTES # BLD AUTO: 1.63 X10(3)/MCL (ref 0.6–4.6)
LYMPHOCYTES NFR BLD AUTO: 16.6 %
MAGNESIUM SERPL-MCNC: 2 MG/DL (ref 1.6–2.6)
MCH RBC QN AUTO: 29.1 PG (ref 27–31)
MCHC RBC AUTO-ENTMCNC: 33.3 G/DL (ref 33–36)
MCV RBC AUTO: 87.6 FL (ref 80–94)
MDMA UR QL SCN: NEGATIVE
MONOCYTES # BLD AUTO: 0.91 X10(3)/MCL (ref 0.1–1.3)
MONOCYTES NFR BLD AUTO: 9.3 %
NEUTROPHILS # BLD AUTO: 7.19 X10(3)/MCL (ref 2.1–9.2)
NEUTROPHILS NFR BLD AUTO: 73.1 %
NITRITE UR QL STRIP: NEGATIVE
NRBC BLD AUTO-RTO: 0 %
OPIATES UR QL SCN: NEGATIVE
PCP UR QL: NEGATIVE
PH UR STRIP: 6 [PH]
PH UR: 6 [PH] (ref 3–11)
PLATELET # BLD AUTO: 270 X10(3)/MCL (ref 130–400)
PMV BLD AUTO: 9.6 FL (ref 7.4–10.4)
POTASSIUM SERPL-SCNC: 3.6 MMOL/L (ref 3.5–5.1)
PROT SERPL-MCNC: 8.5 GM/DL (ref 6.4–8.3)
PROT UR QL STRIP: NEGATIVE
RBC # BLD AUTO: 4.74 X10(6)/MCL (ref 4.2–5.4)
SODIUM SERPL-SCNC: 139 MMOL/L (ref 136–145)
SP GR UR STRIP.AUTO: <=1.005 (ref 1–1.03)
SPECIFIC GRAVITY, URINE AUTO (.000) (OHS): 1 (ref 1–1.03)
TSH SERPL-ACNC: 1.19 UIU/ML (ref 0.35–4.94)
UROBILINOGEN UR STRIP-ACNC: 0.2
WBC # BLD AUTO: 9.83 X10(3)/MCL (ref 4.5–11.5)

## 2024-11-03 PROCEDURE — 80053 COMPREHEN METABOLIC PANEL: CPT | Performed by: INTERNAL MEDICINE

## 2024-11-03 PROCEDURE — 85025 COMPLETE CBC W/AUTO DIFF WBC: CPT | Performed by: INTERNAL MEDICINE

## 2024-11-03 PROCEDURE — 81003 URINALYSIS AUTO W/O SCOPE: CPT | Performed by: INTERNAL MEDICINE

## 2024-11-03 PROCEDURE — 99285 EMERGENCY DEPT VISIT HI MDM: CPT | Mod: 25

## 2024-11-03 PROCEDURE — 82550 ASSAY OF CK (CPK): CPT | Performed by: INTERNAL MEDICINE

## 2024-11-03 PROCEDURE — 80307 DRUG TEST PRSMV CHEM ANLYZR: CPT | Performed by: INTERNAL MEDICINE

## 2024-11-03 PROCEDURE — 83735 ASSAY OF MAGNESIUM: CPT | Performed by: INTERNAL MEDICINE

## 2024-11-03 PROCEDURE — 63600175 PHARM REV CODE 636 W HCPCS: Performed by: INTERNAL MEDICINE

## 2024-11-03 PROCEDURE — 84443 ASSAY THYROID STIM HORMONE: CPT | Performed by: INTERNAL MEDICINE

## 2024-11-03 PROCEDURE — 96374 THER/PROPH/DIAG INJ IV PUSH: CPT

## 2024-11-03 PROCEDURE — 96375 TX/PRO/DX INJ NEW DRUG ADDON: CPT

## 2024-11-03 RX ORDER — PROCHLORPERAZINE EDISYLATE 5 MG/ML
5 INJECTION INTRAMUSCULAR; INTRAVENOUS ONCE
Status: COMPLETED | OUTPATIENT
Start: 2024-11-03 | End: 2024-11-03

## 2024-11-03 RX ORDER — LORAZEPAM 2 MG/ML
2 INJECTION INTRAMUSCULAR
Status: COMPLETED | OUTPATIENT
Start: 2024-11-03 | End: 2024-11-03

## 2024-11-03 RX ADMIN — PROCHLORPERAZINE EDISYLATE 5 MG: 5 INJECTION INTRAMUSCULAR; INTRAVENOUS at 12:11

## 2024-11-03 RX ADMIN — LORAZEPAM 2 MG: 2 INJECTION INTRAMUSCULAR; INTRAVENOUS at 12:11

## 2024-11-03 NOTE — ED PROVIDER NOTES
Source of History:  Patient, no limitations    Chief complaint:  Fall (C/o fall from wheelchair at 10 AM. Sates multiple recent falls and muscle spasms. )      HPI:  Dahiana Rivera is a 48 y.o. female presenting with Fall (C/o fall from wheelchair at 10 AM. Sates multiple recent falls and muscle spasms. )       48-year-old female with history of HTN, hep C, anxiety, presents to the ED for multiple falls and muscle spasms, reports pending surgery Friday of this week in [place]    Patient is here for evaluation after a fall. Patient reportedly was sitting when he fell. The accident occurred a few hours ago. It is reported that the patient did not have LOC. At this time he complains of headache, neck pain, muscle spasms.  Patient denies fever. Symptoms are exacerbated by Nothing.        Review of Systems   Constitutional symptoms:  Negative except as documented in HPI.   Skin symptoms:  Negative except as documented in HPI.   HEENT symptoms:  Negative except as documented in HPI.   Respiratory symptoms:  Negative except as documented in HPI.   Cardiovascular symptoms:  Negative except as documented in HPI.   Gastrointestinal symptoms:  Negative except as documented in HPI.    Genitourinary symptoms:  Negative except as documented in HPI.   Musculoskeletal symptoms:  Negative except as documented in HPI.   Neurologic symptoms:  Negative except as documented in HPI.   Psychiatric symptoms:  Negative except as documented in HPI.   Allergy/immunologic symptoms:  Negative except as documented in HPI.             Additional review of systems information: All other systems reviewed and otherwise negative.      Review of patient's allergies indicates:   Allergen Reactions    Morphine     Opioids - morphine analogues        PMH:  As per HPI and below:    Past Medical History:   Diagnosis Date    Bipolar disorder     Depression     Hypertension     PTSD (post-traumatic stress disorder)     Unspecified viral hepatitis  "C without hepatic coma        Family History   Problem Relation Name Age of Onset    Rheum arthritis Mother Rosamaria Abdoul     Arthritis Mother Rosamaria Abdoul     Hypertension Mother Rosamaria Abdoul     Rheum arthritis Father      Breast cancer Maternal Grandmother Grandmother Lynne 83    Hypertension Maternal Grandmother Grandmother Lynne     Hypertension Maternal Grandfather      Hypertension Paternal Grandfather      Cancer Maternal Aunt Aunt Greta        Past Surgical History:   Procedure Laterality Date    BILATERAL TUBAL LIGATION       SECTION      GANGLION CYST EXCISION      HEMORRHOID SURGERY      TONSILLECTOMY      TUBAL LIGATION  10/22/2003       Social History     Tobacco Use    Smoking status: Every Day     Current packs/day: 0.50     Average packs/day: 0.5 packs/day for 18.2 years (9.1 ttl pk-yrs)     Types: Cigarettes     Start date: 2006    Smokeless tobacco: Never   Substance Use Topics    Alcohol use: Not Currently    Drug use: Never       Patient Active Problem List   Diagnosis    Hypertension    Tobacco user    Polyarthralgia    Bipolar disorder    Left knee pain    Elevated liver enzymes    Elevated TSH    Hepatitis C virus infection without hepatic coma    Hepatitis C antibody positive in blood    Fall    Encounter for colorectal cancer screening    Strain of right shoulder    Encounter for screening mammogram for breast cancer    Encounter for Papanicolaou smear of cervix    Right foot pain    Paresthesia and pain of both upper extremities    Acute left-sided low back pain without sciatica    Post-traumatic spasticity    Spastic quadriparesis    Thyroid nodule        Physical Exam:    BP (!) 126/99 (BP Location: Right arm)   Pulse 102   Temp 98.6 °F (37 °C) (Oral)   Resp 18   Ht 5' 4" (1.626 m)   Wt 77.6 kg (171 lb)   LMP  (LMP Unknown)   SpO2 100%   Breastfeeding No   BMI 29.35 kg/m²     Nursing note and vital signs reviewed.    General:  Alert, no acute distress.  Skin: Normal " for Ethnic Origin, No cyanosis, scattered excoriations to lower limbs  HEENT: Normocephalic and atraumatic, Vision unchanged, Pupils symmetric  Cardiovascular:  Regular rate and rhythm, No edema  Chest Wall: No deformity, equal chest rise  Respiratory:  Lungs are clear to auscultation, respirations are non-labored.    Musculoskeletal:  No deformity, Normal perfusion to all extremities  Gastrointestinal:  Soft, Non distended  Neurological:  Alert and oriented, normal motor observed, normal speech observed.    Psychiatric:  Cooperative, bizarre mood & affect.        Labs that have been ordered have been independently reviewed and interpreted by myself.     Old Chart Reviewed.      Initial Impression/ Differential Dx:  Electrolyte abnormality, hypoglycemia, infectious causes, endocrine abnormalities, medication side effect, dehydration, anemia    Considered but not suspected at this time:  CVA, spinal cord abnormality, Guillain [place], neuromuscular junction disease, muscle disease      MDM:      Reviewed Nurses Note.    Reviewed Pertinent old records.    Orders Placed This Encounter    CT Head Without Contrast    CT Cervical Spine Without Contrast    CBC Auto Differential    Comprehensive Metabolic Panel    CK    Magnesium    TSH    Urinalysis, Reflex to Urine Culture    Drug Screen, Urine    CBC with Differential    prochlorperazine injection Soln 5 mg    LORazepam injection 2 mg                    Labs Reviewed   COMPREHENSIVE METABOLIC PANEL - Abnormal       Result Value    Sodium 139      Potassium 3.6      Chloride 101      CO2 23      Glucose 103 (*)     Blood Urea Nitrogen 20.2 (*)     Creatinine 1.21 (*)     Calcium 10.7 (*)     Protein Total 8.5 (*)     Albumin 4.7      Globulin 3.8 (*)     Albumin/Globulin Ratio 1.2      Bilirubin Total 0.8      ALP 79      ALT 20      AST 20      eGFR 55      Anion Gap 15.0      BUN/Creatinine Ratio 17     CK - Abnormal    Creatine Kinase 189 (*)    DRUG SCREEN, URINE  (BEAKER) - Abnormal    Amphetamines, Urine Negative      Barbiturates, Urine Negative      Benzodiazepine, Urine Negative      Cannabinoids, Urine Negative      Cocaine, Urine Negative      Fentanyl, Urine Positive (*)     MDMA, Urine Negative      Opiates, Urine Negative      Phencyclidine, Urine Negative      pH, Urine 6.0      Specific Gravity, Urine Auto 1.005      Narrative:     Cut off concentrations:    Amphetamines - 1000 ng/ml  Barbiturates - 200 ng/ml  Benzodiazepine - 200 ng/ml  Cannabinoids (THC) - 50 ng/ml  Cocaine - 300 ng/ml  Fentanyl - 1.0 ng/ml  MDMA - 500 ng/ml  Opiates - 300 ng/ml   Phencyclidine (PCP) - 25 ng/ml    Specimen submitted for drug analysis and tested for pH and specific gravity in order to evaluate sample integrity. Suspect tampering if specific gravity is <1.003 and/or pH is not within the range of 4.5 - 8.0  False negatives may result form substances such as bleach added to urine.  False positives may result for the presence of a substance with similar chemical structure to the drug or its metabolite.    This test provides only a PRELIMINARY analytical test result. A more specific alternate chemical method must be used in order to obtain a confirmed analytical result. Gas chromatography/mass spectrometry (GC/MS) is the preferred confirmatory method. Other chemical confirmation methods are available. Clinical consideration and professional judgement should be applied to any drug of abuse test result, particularly when preliminary positive results are used.    Positive results will be confirmed only at the physicians request. Unconfirmed screening results are to be used only for medical purposes (treatment).        MAGNESIUM - Normal    Magnesium Level 2.00     TSH - Normal    TSH 1.192     URINALYSIS, REFLEX TO URINE CULTURE - Normal    Color, UA Straw      Appearance, UA Clear      Specific Gravity, UA <=1.005      pH, UA 6.0      Protein, UA Negative      Glucose, UA Negative       Ketones, UA Negative      Blood, UA Negative      Bilirubin, UA Negative      Urobilinogen, UA 0.2      Nitrites, UA Negative      Leukocyte Esterase, UA Negative     CBC W/ AUTO DIFFERENTIAL    Narrative:     The following orders were created for panel order CBC Auto Differential.  Procedure                               Abnormality         Status                     ---------                               -----------         ------                     CBC with Differential[[id number]]                           Final result                 Please view results for these tests on the individual orders.   CBC WITH DIFFERENTIAL    WBC 9.83      RBC 4.74      Hgb 13.8      Hct 41.5      MCV 87.6      MCH 29.1      MCHC 33.3      RDW 13.6      Platelet 270      MPV 9.6      Neut % 73.1      Lymph % 16.6      Mono % 9.3      Eos % 0.2      Basophil % 0.5      Lymph # 1.63      Neut # 7.19      Mono # 0.91      Eos # 0.02      Baso # 0.05      IG# 0.03      IG% 0.3      NRBC% 0.0            CT Head Without Contrast   Final Result      CT Cervical Spine Without Contrast   Final Result           Admission on 11/03/2024   Component Date Value Ref Range Status    Sodium 11/03/2024 139  136 - 145 mmol/L Final    Potassium 11/03/2024 3.6  3.5 - 5.1 mmol/L Final    Chloride 11/03/2024 101  98 - 107 mmol/L Final    CO2 11/03/2024 23  22 - 29 mmol/L Final    Glucose 11/03/2024 103 (H)  74 - 100 mg/dL Final    Blood Urea Nitrogen 11/03/2024 20.2 (H)  7.0 - 18.7 mg/dL Final    Creatinine 11/03/2024 1.21 (H)  0.55 - 1.02 mg/dL Final    Calcium 11/03/2024 10.7 (H)  8.4 - 10.2 mg/dL Final    Protein Total 11/03/2024 8.5 (H)  6.4 - 8.3 gm/dL Final    Albumin 11/03/2024 4.7  3.5 - 5.0 g/dL Final    Globulin 11/03/2024 3.8 (H)  2.4 - 3.5 gm/dL Final    Albumin/Globulin Ratio 11/03/2024 1.2  1.1 - 2.0 ratio Final    Bilirubin Total 11/03/2024 0.8  <=1.5 mg/dL Final    ALP 11/03/2024 79  40 - 150 unit/L Final    ALT 11/03/2024 20  0 - 55  unit/L Final    AST 11/03/2024 20  5 - 34 unit/L Final    eGFR 11/03/2024 55  mL/min/1.73/m2 Final    Anion Gap 11/03/2024 15.0  mEq/L Final    BUN/Creatinine Ratio 11/03/2024 17   Final    Creatine Kinase 11/03/2024 189 (H)  29 - 168 U/L Final    Magnesium Level 11/03/2024 2.00  1.60 - 2.60 mg/dL Final    TSH 11/03/2024 1.192  0.350 - 4.940 uIU/mL Final    Color, UA 11/03/2024 Straw  Yellow, Light-Yellow, Dark Yellow, Radha, Straw Final    Appearance, UA 11/03/2024 Clear  Clear Final    Specific Gravity, UA 11/03/2024 <=1.005  1.005 - 1.030 Final    pH, UA 11/03/2024 6.0  5.0 - 8.5 Final    Protein, UA 11/03/2024 Negative  Negative Final    Glucose, UA 11/03/2024 Negative  Negative, Normal Final    Ketones, UA 11/03/2024 Negative  Negative Final    Blood, UA 11/03/2024 Negative  Negative Final    Bilirubin, UA 11/03/2024 Negative  Negative Final    Urobilinogen, UA 11/03/2024 0.2  0.2, 1.0, Normal Final    Nitrites, UA 11/03/2024 Negative  Negative Final    Leukocyte Esterase, UA 11/03/2024 Negative  Negative Final    Amphetamines, Urine 11/03/2024 Negative  Negative Final    Barbiturates, Urine 11/03/2024 Negative  Negative Final    Benzodiazepine, Urine 11/03/2024 Negative  Negative Final    Cannabinoids, Urine 11/03/2024 Negative  Negative Final    Cocaine, Urine 11/03/2024 Negative  Negative Final    Fentanyl, Urine 11/03/2024 Positive (A)  Negative Final    MDMA, Urine 11/03/2024 Negative  Negative Final    Opiates, Urine 11/03/2024 Negative  Negative Final    Phencyclidine, Urine 11/03/2024 Negative  Negative Final    pH, Urine 11/03/2024 6.0  3.0 - 11.0 Final    Specific Gravity, Urine Auto 11/03/2024 1.005  1.001 - 1.035 Final    WBC 11/03/2024 9.83  4.50 - 11.50 x10(3)/mcL Final    RBC 11/03/2024 4.74  4.20 - 5.40 x10(6)/mcL Final    Hgb 11/03/2024 13.8  12.0 - 16.0 g/dL Final    Hct 11/03/2024 41.5  37.0 - 47.0 % Final    MCV 11/03/2024 87.6  80.0 - 94.0 fL Final    MCH 11/03/2024 29.1  27.0 - 31.0 pg  Final    MCHC 11/03/2024 33.3  33.0 - 36.0 g/dL Final    RDW 11/03/2024 13.6  11.5 - 17.0 % Final    Platelet 11/03/2024 270  130 - 400 x10(3)/mcL Final    MPV 11/03/2024 9.6  7.4 - 10.4 fL Final    Neut % 11/03/2024 73.1  % Final    Lymph % 11/03/2024 16.6  % Final    Mono % 11/03/2024 9.3  % Final    Eos % 11/03/2024 0.2  % Final    Basophil % 11/03/2024 0.5  % Final    Lymph # 11/03/2024 1.63  0.6 - 4.6 x10(3)/mcL Final    Neut # 11/03/2024 7.19  2.1 - 9.2 x10(3)/mcL Final    Mono # 11/03/2024 0.91  0.1 - 1.3 x10(3)/mcL Final    Eos # 11/03/2024 0.02  0 - 0.9 x10(3)/mcL Final    Baso # 11/03/2024 0.05  <=0.2 x10(3)/mcL Final    IG# 11/03/2024 0.03  0 - 0.04 x10(3)/mcL Final    IG% 11/03/2024 0.3  % Final    NRBC% 11/03/2024 0.0  % Final       Imaging Results              CT Head Without Contrast (Final result)  Result time 11/03/24 12:39:28      Final result by Carlos Hopkins MD (11/03/24 12:39:28)                   Narrative:    EXAMINATION  CT HEAD WITHOUT CONTRAST    CLINICAL HISTORY  Altered mental status;    TECHNIQUE  Axial non-contrast CT images of the head were acquired and multiplanar reconstructions accomplished by a CT technologist at a separate workstation, pushed to PACS for physician review.    COMPARISON  7 September 2024    FINDINGS  Images were reviewed in subdural, brain, soft tissue, and bone windows.    Exam quality: Motion/streak artifact limits assessment of the posterior fossa.    Hemorrhage: No evidence of acute hyperattenuating blood products.    Parenchyma: No discrete mass, localized mass-effect, or CT evidence of an acute territorial cortical-based ischemic insult. Gray-white differentiation is preserved.    Midline shift: None.    CSF spaces: Normal ventricle size and configuration. No extra-axial masses or expansile fluid collections.    Vasculature: No hyperdense artery identified. No abnormal densities within the dural sinuses.    Other findings: No abnormalities of the scalp or  subjacent osseous structures. Mastoids are well aerated. No focal abnormality of the sella. The included facial structures are unremarkable.    IMPRESSION  No CT-evident acute intracranial abnormality.    RADIATION DOSE  Automated tube current modulation, weight-based exposure dosing, and/or iterative reconstruction technique utilized to reach lowest reasonably achievable exposure rate.    DLP: 1060 mGy*cm      Electronically signed by: Carlos Hopkins  Date:    11/03/2024  Time:    12:39                                     CT Cervical Spine Without Contrast (Final result)  Result time 11/03/24 12:41:53      Final result by Carlos Hopkins MD (11/03/24 12:41:53)                   Narrative:    EXAMINATION  CT CERVICAL SPINE WITHOUT CONTRAST    CLINICAL HISTORY  Neck pain, recent trauma;    TECHNIQUE  Non-contrast helical-acquisition CT images of the cervical spine were obtained and multiplanar reformats accomplished by a CT technologist at a separate workstation, pushed to PACS for physician review.    COMPARISON  7 September 2024    FINDINGS  Images were reviewed in bone, soft tissue, and lung windows.    Exam quality: adequate for evaluation    Vertebral bodies: No displaced fracture visualized. No acute compression deformity or focal vertebral body abnormality. The C1 lateral masses are symmetrically aligned on C2. No evidence of traumatic subluxation. Multilevel degenerative bilateral (right > left) uncovertebral and facet changes are similar in the relatively short interval.    Disc spaces: Normal disc space height grossly maintained through the visualized spinal levels.    Curvature: No grossly abnormal curvature appreciated.    Paravertebral tissue/musculature: No prevertebral soft tissue thickening, expansile fluid, or other focal contour irregularity.  The paraspinal musculature and overlying subcutaneous tissues demonstrate no acute or focal lesion.    Other findings: Visualized thyroid gland is unchanged in  comparison.  No focal vascular abnormality is appreciated by non-contrast appearance.  The included upper lung zones and mediastinal structures are unremarkable.  No acute or focal abnormality of the visualized brain and skull base.    IMPRESSION  1. No convincing acute abnormality.  2. Chronic/incidental details discussed above, no significant change from the recent comparison.  ==========    Please note ligament, spinal cord, and/or vascular abnormalities cannot be excluded on the basis of this examination.  Additional imaging recommended if there is elevated clinical concern for high-grade soft tissue injury.    RADIATION DOSE  Automated tube current modulation, weight-based exposure dosing, and/or iterative reconstruction technique utilized to reach lowest reasonably achievable exposure rate.    DLP: 1060 mGy*cm      Electronically signed by: Carlos Hopkins  Date:    11/03/2024  Time:    12:41                                                   ED Course as of 11/03/24 1302   Sun Nov 03, 2024   1223 Fentanyl, Urine(!): Positive [MP]   1223 CPK(!): 189 [MP]   1224 WBC: 9.83 [MP]   1224 Hemoglobin: 13.8 [MP]   1224 Hematocrit: 41.5 [MP]   1253 Suspect underlying psychiatric component to her presentation [MP]      ED Course User Index  [MP] Dong Wing,                         Diagnostic Impression:    1. Fall, initial encounter    2. Chronic neck pain    3. Muscle spasm         ED Disposition Condition    Discharge Stable             Follow-up Information       [facility] - Emergency Dept.    Specialty: Emergency Medicine  Why: If symptoms worsen  Contact information:  [address]  [facility] [address] [phone]             Call  Kinsey Sadler FNP.    Specialty: Family Medicine  Contact information:  [address] [facility] [address] [phone]                              ED Prescriptions    None       Follow-up Information       Follow  up With Specialties Details Why Contact Info    [facility] - Emergency Dept Emergency Medicine  If symptoms worsen [address]  [facility] [id number] [phone]    Kinsey Sadler, P Family Medicine Call   [address] [facility] [address]  [phone]               Dong Wing, DO  11/03/24 1307
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
## 2024-11-07 ENCOUNTER — HOSPITAL ENCOUNTER (OUTPATIENT)
Dept: RADIOLOGY | Facility: HOSPITAL | Age: 49
Discharge: HOME | End: 2024-11-07
Attending: NURSE PRACTITIONER
Payer: COMMERCIAL

## 2024-11-07 DIAGNOSIS — E04.1 THYROID NODULE: ICD-10-CM

## 2024-11-07 PROCEDURE — 76536 US EXAM OF HEAD AND NECK: CPT | Mod: TC

## 2024-11-08 ENCOUNTER — TELEPHONE (OUTPATIENT)
Dept: FAMILY MEDICINE | Facility: CLINIC | Age: 49
End: 2024-11-08
Payer: COMMERCIAL

## 2024-11-08 NOTE — TELEPHONE ENCOUNTER
----- Message from ERICA Sainz sent at 11/8/2024 11:18 AM CST -----  PLEASE CALL PATIENTS WITH RESULT  Solid nodule to left lobe that requires biopsy .  ENT referral has been initiated.  Please expect a phone call to schedule appointment.

## 2024-11-08 NOTE — PROGRESS NOTES
PLEASE CALL PATIENTS WITH RESULT  Solid nodule to left lobe that requires biopsy .  ENT referral has been initiated.  Please expect a phone call to schedule appointment.

## 2024-11-12 ENCOUNTER — TELEPHONE (OUTPATIENT)
Dept: FAMILY MEDICINE | Facility: CLINIC | Age: 49
End: 2024-11-12
Payer: COMMERCIAL

## 2024-11-12 NOTE — TELEPHONE ENCOUNTER
Spoke to 's office regarding this patient. They state that this patient needs a new referral. The previous referral was only for an EMG. A new referral is needed to treat patient.

## 2024-11-14 ENCOUNTER — TELEPHONE (OUTPATIENT)
Dept: FAMILY MEDICINE | Facility: CLINIC | Age: 49
End: 2024-11-14
Payer: COMMERCIAL

## 2024-11-14 NOTE — TELEPHONE ENCOUNTER
Called patient to give results. Patient verbalized understanding. No additional questions at this time.       ----- Message from ERICA Sainz sent at 11/8/2024 11:18 AM CST -----  PLEASE CALL PATIENTS WITH RESULT  Solid nodule to left lobe that requires biopsy .  ENT referral has been initiated.  Please expect a phone call to schedule appointment.

## 2024-11-21 ENCOUNTER — TELEPHONE (OUTPATIENT)
Dept: FAMILY MEDICINE | Facility: CLINIC | Age: 49
End: 2024-11-21
Payer: COMMERCIAL

## 2024-11-21 NOTE — TELEPHONE ENCOUNTER
Patient states she will have to  another kit. Patient also was reminded of her mammogram appointment.     ----- Message from ERICA Sainz sent at 11/20/2024  9:54 AM CST -----  Regarding: Mammogram and occult fecal immunoassay  Please advise patient to bring back occult fecal immunoassay as soon as possible as well to keep appointment for mammogram.  Thank you and happy holidays

## 2024-11-22 ENCOUNTER — TELEPHONE (OUTPATIENT)
Dept: OTOLARYNGOLOGY | Facility: CLINIC | Age: 49
End: 2024-11-22
Payer: COMMERCIAL

## 2024-11-22 DIAGNOSIS — E04.1 THYROID NODULE: Primary | ICD-10-CM

## 2024-11-22 NOTE — TELEPHONE ENCOUNTER
----- Message from Prasanna sent at 11/22/2024  1:01 PM CST -----  Regarding: Thyroid Referral  Thyroid Nodule - US Results in system

## 2024-12-09 ENCOUNTER — TELEPHONE (OUTPATIENT)
Dept: INTERVENTIONAL RADIOLOGY/VASCULAR | Facility: HOSPITAL | Age: 49
End: 2024-12-09
Payer: COMMERCIAL

## 2024-12-09 NOTE — TELEPHONE ENCOUNTER
Patient called and IR Thyroid scheduled and confirmed for 1/9 at 11 am. The patient was also instructed to go to lab and she verbally understood.

## 2025-01-03 DIAGNOSIS — E07.89 OTHER SPECIFIED DISORDERS OF THYROID: ICD-10-CM

## 2025-01-03 DIAGNOSIS — E04.1 THYROID NODULE: Primary | ICD-10-CM

## 2025-01-08 ENCOUNTER — TELEPHONE (OUTPATIENT)
Dept: INTERVENTIONAL RADIOLOGY/VASCULAR | Facility: HOSPITAL | Age: 50
End: 2025-01-08
Payer: COMMERCIAL

## 2025-01-08 NOTE — TELEPHONE ENCOUNTER
Patient called and reminded about her IR FNA on tomorrow in Radiology entrance 3. Patient was also reminded to do labs prior to procedure as decided in previous phone called. She verbally understood.

## 2025-01-09 ENCOUNTER — HOSPITAL ENCOUNTER (OUTPATIENT)
Dept: INTERVENTIONAL RADIOLOGY/VASCULAR | Facility: HOSPITAL | Age: 50
Discharge: HOME | End: 2025-01-09
Attending: NURSE PRACTITIONER
Payer: COMMERCIAL

## 2025-01-09 DIAGNOSIS — E04.1 THYROID NODULE: ICD-10-CM

## 2025-01-09 PROCEDURE — 10005 FNA BX W/US GDN 1ST LES: CPT

## 2025-01-09 NOTE — H&P
IR Pre Procedure Note   Subjective:      Dahiana Rivera is a 49 y.o. female who presents today with Left Thyroid Nodule. This consultation is requested for the specific conditions prompting preoperative evaluation for: Left Thyroid Nodule Biopsy.     Planned anesthesia: local.     The following portions of the patient's history were reviewed and updated as appropriate: allergies, current medications, past family history, past medical history, past social history, past surgical history, and problem list.    Review of Systems:  Pertinent items are noted in HPI.     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         Social History:  Social History     Socioeconomic History    Marital status: Significant Other    Number of children: 2    Highest education level: Master's degree (e.g., MA, MS, [place], MEd, MSW, GENARO)   Occupational History    Occupation: teacher/unemployed   Tobacco Use    Smoking status: Every Day     Current packs/day: 0.50     Average packs/day: 0.5 packs/day for 18.4 years (9.2 ttl pk-yrs)     Types: Cigarettes     Start date: 8/13/2006    Smokeless tobacco: Never   Substance and Sexual Activity    Alcohol use: Not Currently    Drug use: Never    Sexual activity: Yes     Partners: Male     Birth control/protection: I.U.D.     Comment: Tubal in 2003     Social Drivers of Health     Financial Resource Strain: Low Risk  (1/18/2024)    Overall Financial Resource Strain (CARDIA)     Difficulty of Paying Living Expenses: Not hard at all   Food Insecurity: No Food Insecurity (1/18/2024)    Hunger Vital Sign     Worried About Running Out of Food in the Last Year: Never true     Ran Out of Food in the Last Year: Never true   Transportation Needs: No Transportation Needs (1/18/2024)    PRAPARE - Transportation     Lack of Transportation (Medical):  No     Lack of Transportation (Non-Medical): No   Physical Activity: Insufficiently Active (1/18/2024)    Exercise Vital Sign     Days of Exercise per Week: 3 days     Minutes of Exercise per Session: 30 min   Stress: No Stress Concern Present (1/18/2024)    [employer] - Occupational Stress Questionnaire     Feeling of Stress : Only a little   Housing Stability: Low Risk  (1/18/2024)    Housing Stability Vital Sign     Unable to Pay for Housing in the Last Year: No     Number of Places Lived in the Last Year: 1     Unstable Housing in the Last Year: No        Medication History:  Current Outpatient Medications on File Prior to Encounter    Order #: [id number]: Historical Med    Order #: [id number]: Historical Med    Order #: [id number]: Historical Med    Order #: [id number]: Historical Med    Order #: [id number]: Normal    Order #: [id number]: Normal    Order #: [id number]: Normal    Order #: [id number]: Normal    Order #: [id number]: Normal    Order #: [id number]: Historical Med        Allergies:  Review of patient's allergies indicates:   Allergen Reactions    Morphine     Opioids - morphine analogues          Objective:     Physical Exam:  Vital Signs: BP: ()/()   Arterial Line BP: ()/()   General: No acute distress; awake, alert, and oriented x 3  HEENT: Normocephalic, atraumatic, extraocular movements intact  Cardiac:Positive S1, S2  Respiratory: Unlabored respirations  ABD: Nontender, soft  Neurological: Grossly intact; moves all 4 extremities without difficulty  Ext: No cyanosis, clubbing, or edema    Predictors of intubation difficulty:       Imaging  The pertinent imaging was personally reviewed for procedural planning, safety, and feasibility by Dr Arik Gonzalez.    Lab Review   Lab Results   Component Value Date     11/03/2024    K 3.6 11/03/2024     11/03/2024    CO2 23 11/03/2024    BUN 20.2 (H) 11/03/2024     CREATININE 1.21 (H) 11/03/2024    GLUCOSE 103 (H) 11/03/2024    CALCIUM 10.7 (H) 11/03/2024     Lab Results   Component Value Date    WBC 9.83 11/03/2024    HGB 13.8 11/03/2024    HCT 41.5 11/03/2024    MCV 87.6 11/03/2024     11/03/2024         Assessment:        49 y.o. female presenting with Left Thyroid Nodule.  The patient is being evaluated for Left Thyroid Nodule Biopsy.         Plan:        Proceed with Biopsy.

## 2025-01-10 ENCOUNTER — TELEPHONE (OUTPATIENT)
Dept: INTERVENTIONAL RADIOLOGY/VASCULAR | Facility: HOSPITAL | Age: 50
End: 2025-01-10
Payer: COMMERCIAL

## 2025-01-20 DIAGNOSIS — I10 PRIMARY HYPERTENSION: ICD-10-CM

## 2025-01-23 RX ORDER — HYDROCHLOROTHIAZIDE 12.5 MG/1
12.5 TABLET ORAL DAILY
Qty: 90 TABLET | Refills: 2 | Status: SHIPPED | OUTPATIENT
Start: 2025-01-23 | End: 2026-01-23

## 2025-01-23 RX ORDER — LISINOPRIL 40 MG/1
40 TABLET ORAL DAILY
Qty: 90 TABLET | Refills: 2 | Status: SHIPPED | OUTPATIENT
Start: 2025-01-23 | End: 2026-01-23

## 2025-03-26 ENCOUNTER — HOSPITAL ENCOUNTER (EMERGENCY)
Facility: HOSPITAL | Age: 50
Discharge: HOME | End: 2025-03-26
Attending: STUDENT IN AN ORGANIZED HEALTH CARE EDUCATION/TRAINING PROGRAM
Payer: COMMERCIAL

## 2025-03-26 VITALS
HEIGHT: 64 IN | DIASTOLIC BLOOD PRESSURE: 78 MMHG | HEART RATE: 112 BPM | SYSTOLIC BLOOD PRESSURE: 130 MMHG | WEIGHT: 160 LBS | TEMPERATURE: 98 F | RESPIRATION RATE: 19 BRPM | OXYGEN SATURATION: 100 % | BODY MASS INDEX: 27.31 KG/M2

## 2025-03-26 DIAGNOSIS — M25.572 LEFT ANKLE PAIN: ICD-10-CM

## 2025-03-26 DIAGNOSIS — M79.89 LEFT LEG SWELLING: ICD-10-CM

## 2025-03-26 DIAGNOSIS — S82.832A CLOSED FRACTURE OF DISTAL END OF LEFT FIBULA, UNSPECIFIED FRACTURE MORPHOLOGY, INITIAL ENCOUNTER: Primary | ICD-10-CM

## 2025-03-26 LAB
ALBUMIN SERPL-MCNC: 4.1 G/DL (ref 3.5–5)
ALBUMIN/GLOB SERPL: 1 RATIO (ref 1.1–2)
ALP SERPL-CCNC: 81 UNIT/L (ref 40–150)
ALT SERPL-CCNC: 12 UNIT/L (ref 0–55)
ANION GAP SERPL CALC-SCNC: 12 MEQ/L
AST SERPL-CCNC: 17 UNIT/L (ref 11–45)
B-HCG UR QL: NEGATIVE
BASOPHILS # BLD AUTO: 0.04 X10(3)/MCL
BASOPHILS NFR BLD AUTO: 0.5 %
BILIRUB SERPL-MCNC: 0.5 MG/DL
BUN SERPL-MCNC: 25.9 MG/DL (ref 7–18.7)
CALCIUM SERPL-MCNC: 9.5 MG/DL (ref 8.4–10.2)
CHLORIDE SERPL-SCNC: 100 MMOL/L (ref 98–107)
CO2 SERPL-SCNC: 27 MMOL/L (ref 22–29)
CREAT SERPL-MCNC: 1.15 MG/DL (ref 0.55–1.02)
CREAT/UREA NIT SERPL: 23
EOSINOPHIL # BLD AUTO: 0.04 X10(3)/MCL (ref 0–0.9)
EOSINOPHIL NFR BLD AUTO: 0.5 %
ERYTHROCYTE [DISTWIDTH] IN BLOOD BY AUTOMATED COUNT: 13.6 % (ref 11.5–17)
GFR SERPLBLD CREATININE-BSD FMLA CKD-EPI: 59 ML/MIN/1.73/M2
GLOBULIN SER-MCNC: 4.1 GM/DL (ref 2.4–3.5)
GLUCOSE SERPL-MCNC: 89 MG/DL (ref 74–100)
HCT VFR BLD AUTO: 37.6 % (ref 37–47)
HGB BLD-MCNC: 12 G/DL (ref 12–16)
IMM GRANULOCYTES # BLD AUTO: 0.02 X10(3)/MCL (ref 0–0.04)
IMM GRANULOCYTES NFR BLD AUTO: 0.3 %
LYMPHOCYTES # BLD AUTO: 1.1 X10(3)/MCL (ref 0.6–4.6)
LYMPHOCYTES NFR BLD AUTO: 15.1 %
MCH RBC QN AUTO: 27.8 PG (ref 27–31)
MCHC RBC AUTO-ENTMCNC: 31.9 G/DL (ref 33–36)
MCV RBC AUTO: 87 FL (ref 80–94)
MONOCYTES # BLD AUTO: 0.55 X10(3)/MCL (ref 0.1–1.3)
MONOCYTES NFR BLD AUTO: 7.5 %
NEUTROPHILS # BLD AUTO: 5.54 X10(3)/MCL (ref 2.1–9.2)
NEUTROPHILS NFR BLD AUTO: 76.1 %
NRBC BLD AUTO-RTO: 0 %
PLATELET # BLD AUTO: 268 X10(3)/MCL (ref 130–400)
PMV BLD AUTO: 9 FL (ref 7.4–10.4)
POTASSIUM SERPL-SCNC: 4.4 MMOL/L (ref 3.5–5.1)
PROT SERPL-MCNC: 8.2 GM/DL (ref 6.4–8.3)
RBC # BLD AUTO: 4.32 X10(6)/MCL (ref 4.2–5.4)
SODIUM SERPL-SCNC: 139 MMOL/L (ref 136–145)
WBC # BLD AUTO: 7.29 X10(3)/MCL (ref 4.5–11.5)

## 2025-03-26 PROCEDURE — 99284 EMERGENCY DEPT VISIT MOD MDM: CPT | Mod: 25

## 2025-03-26 PROCEDURE — 80053 COMPREHEN METABOLIC PANEL: CPT | Performed by: PHYSICIAN ASSISTANT

## 2025-03-26 PROCEDURE — 81025 URINE PREGNANCY TEST: CPT | Performed by: PHYSICIAN ASSISTANT

## 2025-03-26 PROCEDURE — 85025 COMPLETE CBC W/AUTO DIFF WBC: CPT | Performed by: PHYSICIAN ASSISTANT

## 2025-03-26 PROCEDURE — 29515 APPLICATION SHORT LEG SPLINT: CPT | Mod: LT

## 2025-03-26 PROCEDURE — 25000003 PHARM REV CODE 250: Performed by: PHYSICIAN ASSISTANT

## 2025-03-26 RX ORDER — HYDROCODONE BITARTRATE AND ACETAMINOPHEN 5; 325 MG/1; MG/1
1 TABLET ORAL EVERY 6 HOURS PRN
Qty: 20 TABLET | Refills: 0 | Status: SHIPPED | OUTPATIENT
Start: 2025-03-26 | End: 2025-03-31

## 2025-03-26 RX ORDER — HYDROCODONE BITARTRATE AND ACETAMINOPHEN 10; 325 MG/1; MG/1
1 TABLET ORAL
Refills: 0 | Status: COMPLETED | OUTPATIENT
Start: 2025-03-26 | End: 2025-03-26

## 2025-03-26 RX ORDER — IBUPROFEN 800 MG/1
800 TABLET ORAL 3 TIMES DAILY
Qty: 21 TABLET | Refills: 0 | Status: SHIPPED | OUTPATIENT
Start: 2025-03-26

## 2025-03-26 RX ADMIN — HYDROCODONE BITARTRATE AND ACETAMINOPHEN 1 TABLET: 10; 325 TABLET ORAL at 09:03

## 2025-03-26 NOTE — FIRST PROVIDER EVALUATION
"Medical screening examination initiated.  I have conducted a focused provider triage encounter, findings are as follows:    Brief history of present illness:  49yoWF w/ left ankle pain and swelling x 2days after fall. Ambulates with cane at baseline.  Having left lower extremity swelling and edema and erythema as of yesterday.didn't take regular medicine today.     Vitals:    03/26/25 1814   Pulse: (!) 112   Resp: 20   Temp: 98.4 °F (36.9 °C)   SpO2: 100%   Weight: 72.6 kg (160 lb)   Height: 5' 4" (1.626 m)       Pertinent physical exam:  LLE swelling. +2DP pulse.     Brief workup plan:  imaging.    Preliminary workup initiated; this workup will be continued and followed by the physician or advanced practice provider that is assigned to the patient when roomed.  "

## 2025-03-27 NOTE — ED PROVIDER NOTES
Encounter Date: 3/26/2025       History     Chief Complaint   Patient presents with    Ankle Pain     L ankle pain after a trip and fall 2 nights ago. LLE swelling and redness noted.     49-year-old female presents to ED for evaluation of left ankle pain and swelling after a trip and fall 2 days ago.  Patient reports that she tripped and fell over a broom.  Patient denies hitting her head or loss of consciousness.  States she has been having trouble walking on her left leg due to pain.  States this has swelling worse than normal.    The history is provided by the patient. No  was used.     Review of patient's allergies indicates:   Allergen Reactions    Morphine     Opioids - morphine analogues      Past Medical History:   Diagnosis Date    Bipolar disorder     Depression     Hypertension     PTSD (post-traumatic stress disorder)     Unspecified viral hepatitis C without hepatic coma      Past Surgical History:   Procedure Laterality Date    BILATERAL TUBAL LIGATION       SECTION      GANGLION CYST EXCISION      HEMORRHOID SURGERY      TONSILLECTOMY      TUBAL LIGATION  10/22/2003     Family History   Problem Relation Name Age of Onset    Rheum arthritis Mother Rosamaria Abdoul     Arthritis Mother Rosamaria Abdoul     Hypertension Mother Rosamaria Abdoul     Rheum arthritis Father      Breast cancer Maternal Grandmother Grandmother Lynne 83    Hypertension Maternal Grandmother Grandmother Lynne     Hypertension Maternal Grandfather      Hypertension Paternal Grandfather      Cancer Maternal Aunt Aunt Greta      Social History[1]  Review of Systems   Constitutional:  Negative for chills, fatigue and fever.   Respiratory:  Negative for shortness of breath.    Cardiovascular:  Negative for chest pain.   Gastrointestinal:  Negative for abdominal pain, diarrhea, nausea and vomiting.   Genitourinary:  Negative for dysuria, flank pain, frequency, pelvic pain and urgency.   Musculoskeletal:  Positive for  arthralgias and joint swelling. Negative for myalgias.   All other systems reviewed and are negative.      Physical Exam     Initial Vitals   BP Pulse Resp Temp SpO2   03/26/25 1815 03/26/25 1814 03/26/25 1814 03/26/25 1814 03/26/25 1814   130/78 (!) 112 20 98.4 °F (36.9 °C) 100 %      MAP       --                Physical Exam    Vitals reviewed.  Constitutional: She appears well-developed.   HENT:   Head: Normocephalic and atraumatic.   Right Ear: Tympanic membrane and external ear normal.   Left Ear: Tympanic membrane and external ear normal. Mouth/Throat: Uvula is midline, oropharynx is clear and moist and mucous membranes are normal. No trismus in the jaw. No uvula swelling. No oropharyngeal exudate, posterior oropharyngeal edema or posterior oropharyngeal erythema.   Eyes: Conjunctivae are normal. Pupils are equal, round, and reactive to light.   Neck: Neck supple.   Normal range of motion.  Cardiovascular:  Normal rate, regular rhythm and normal heart sounds.           Pulmonary/Chest: Breath sounds normal. She has no wheezes. She has no rhonchi. She has no rales.   Abdominal: Abdomen is soft. Bowel sounds are normal. There is no abdominal tenderness.   Musculoskeletal:         General: Normal range of motion.      Cervical back: Normal range of motion and neck supple.      Comments: Swelling noted to left lower leg.  Tenderness noted to lateral side.  Mild erythema.  DP pulses 2+.  All other adjacent joints negative     Neurological: She is alert and oriented to person, place, and time.   Skin: Skin is warm and dry.   Psychiatric: She has a normal mood and affect.         ED Course   Procedures  Labs Reviewed   COMPREHENSIVE METABOLIC PANEL - Abnormal       Result Value    Sodium 139      Potassium 4.4      Chloride 100      CO2 27      Glucose 89      Blood Urea Nitrogen 25.9 (*)     Creatinine 1.15 (*)     Calcium 9.5      Protein Total 8.2      Albumin 4.1      Globulin 4.1 (*)     Albumin/Globulin Ratio  1.0 (*)     Bilirubin Total 0.5      ALP 81      ALT 12      AST 17      eGFR 59      Anion Gap 12.0      BUN/Creatinine Ratio 23     CBC WITH DIFFERENTIAL - Abnormal    WBC 7.29      RBC 4.32      Hgb 12.0      Hct 37.6      MCV 87.0      MCH 27.8      MCHC 31.9 (*)     RDW 13.6      Platelet 268      MPV 9.0      Neut % 76.1      Lymph % 15.1      Mono % 7.5      Eos % 0.5      Basophil % 0.5      Imm Grans % 0.3      Neut # 5.54      Lymph # 1.10      Mono # 0.55      Eos # 0.04      Baso # 0.04      Imm Gran # 0.02      NRBC% 0.0     PREGNANCY TEST, URINE RAPID - Normal    hCG Qualitative, Urine Negative     CBC W/ AUTO DIFFERENTIAL    Narrative:     The following orders were created for panel order CBC auto differential.  Procedure                               Abnormality         Status                     ---------                               -----------         ------                     CBC with Differential[[id number]]       Abnormal            Final result                 Please view results for these tests on the individual orders.          Imaging Results              X-Ray Ankle Complete Left (Final result)  Result time 03/26/25 18:31:57      Final result by Fanta Daugherty MD (03/26/25 18:31:57)                   Impression:      Fracture of the distal fibula metaphysis with associated soft tissue swelling in the ankle      Electronically signed by: Draius Daugherty  Date:    03/26/2025  Time:    18:31               Narrative:    EXAMINATION:  XR ANKLE COMPLETE 3 VIEW LEFT    CLINICAL HISTORY:  Pain in left ankle and joints of left foot    TECHNIQUE:  AP, lateral and oblique views were performed.    COMPARISON:  None    FINDINGS:  There is an obliquely oriented fracture of the distal fibula metaphysis.  It is minimally displaced.  There is associated soft tissue swelling in the lateral aspect of the ankle.  No other fractures are seen..                                       Medications    HYDROcodone-acetaminophen  mg per tablet 1 tablet (1 tablet Oral Given 3/26/25 2104)     Medical Decision Making  49-year-old female presents to ED for evaluation of left ankle pain and swelling after a trip and fall 2 days ago.  Patient reports that she tripped and fell over a broom.  Patient denies hitting her head or loss of consciousness.  States she has been having trouble walking on her left leg due to pain.  States this has swelling worse than normal.    Differential diagnosis includes but isn't limited to fall, contusion, fracture, dislocation, leg swelling, DVT    Amount and/or Complexity of Data Reviewed  Discussion of management or test interpretation with external provider(s): Patient presents to ED for evaluation after trip and fall 2 days ago.  Left ankle pain and swelling.  X-ray showing a left fibula fracture.  Placed in a short-leg posterior splint with crutches.  Given Norco here.  Discussed using swabs to his possible while she is currently on benzodiazepines as well.  Patient concerned about her leg swelling and states that she is concerned about a DVT therefore a ultrasound steady with worse from triage.  Labs unremarkable as well.  Patient placed in a short leg posterior splint.  Splint applied by GALDINO Lee.  Patient is neurovascularly intact following splint placement..  Discussed follow up with Orthopedics.  Discussed return ED precautions.  Patient verbalizes understanding.    Risk  OTC drugs.  Prescription drug management.  Parenteral controlled substances.                                      Clinical Impression:  Final diagnoses:  [M79.89] Left leg swelling  [M25.572] Left ankle pain  [S82.832A] Closed fracture of distal end of left fibula, unspecified fracture morphology, initial encounter (Primary)          ED Disposition Condition    Discharge Stable          ED Prescriptions       Medication Sig Dispense Start Date End Date Auth. Provider    HYDROcodone-acetaminophen (NORCO)  5-325 mg per tablet Take 1 tablet by mouth every 6 (six) hours as needed for Pain. 20 tablet 3/26/2025 3/31/2025 Sumi Goodman PA    ibuprofen (ADVIL,MOTRIN) 800 MG tablet Take 1 tablet (800 mg total) by mouth 3 (three) times daily. 21 tablet 3/26/2025 -- Sumi Goodman PA          Follow-up Information       Follow up With Specialties Details Why Contact Info    Santos Leon MD Orthopedic Surgery In 1 week  [address] [place], [place] [id number] [phone]               Sumi Goodman PA  03/27/25 0210         [1]   Social History  Tobacco Use    Smoking status: Every Day     Current packs/day: 0.50     Average packs/day: 0.5 packs/day for 18.6 years (9.3 ttl pk-yrs)     Types: Cigarettes     Start date: 8/13/2006    Smokeless tobacco: Never   Substance Use Topics    Alcohol use: Not Currently    Drug use: Never        Sumi Goodman PA  03/27/25 0212

## 2025-03-27 NOTE — DISCHARGE INSTRUCTIONS
Keep splint in place.  Non-weight bear using crutches.  May use ibuprofen 800 for pain and swelling.  Take Norco for severe pain.  Do not drink or drive while taking narcotics this can be sedating.  Follow up with Orthopedics for further evaluation.  Call Dr. Leon's office for an appointment.

## 2025-04-01 ENCOUNTER — OFFICE VISIT (OUTPATIENT)
Dept: ORTHOPEDICS | Facility: CLINIC | Age: 50
End: 2025-04-01
Payer: COMMERCIAL

## 2025-04-01 ENCOUNTER — HOSPITAL ENCOUNTER (OUTPATIENT)
Dept: RADIOLOGY | Facility: CLINIC | Age: 50
Discharge: HOME | End: 2025-04-01
Attending: ORTHOPAEDIC SURGERY
Payer: COMMERCIAL

## 2025-04-01 VITALS
WEIGHT: 160.06 LBS | HEART RATE: 139 BPM | RESPIRATION RATE: 18 BRPM | BODY MASS INDEX: 27.33 KG/M2 | HEIGHT: 64 IN | DIASTOLIC BLOOD PRESSURE: 75 MMHG | SYSTOLIC BLOOD PRESSURE: 116 MMHG

## 2025-04-01 DIAGNOSIS — S82.832A CLOSED FRACTURE OF DISTAL END OF LEFT FIBULA, UNSPECIFIED FRACTURE MORPHOLOGY, INITIAL ENCOUNTER: ICD-10-CM

## 2025-04-01 DIAGNOSIS — S82.832A CLOSED FRACTURE OF DISTAL END OF LEFT FIBULA, UNSPECIFIED FRACTURE MORPHOLOGY, INITIAL ENCOUNTER: Primary | ICD-10-CM

## 2025-04-01 PROCEDURE — 73610 X-RAY EXAM OF ANKLE: CPT | Mod: LT,,, | Performed by: ORTHOPAEDIC SURGERY

## 2025-04-01 NOTE — PROGRESS NOTES
[facility]      Name: Dahiana Rivera  : 1975  MRN: [id number]  Date: 2025    Chief Complaint   Patient presents with    Left Ankle - Injury     1 week f/u left ankle fx, tripped over a broom.  In splint, wb to lle with crutches.         Subjective:      Chief Complaint   Patient presents with    Left Ankle - Injury     1 week f/u left ankle fx, tripped over a broom.  In splint, wb to lle with crutches.          HPI  Dahiana Rivera is a 49 y.o. female presents to clinic for emergency department follow up of left ankle fracture.  Approximately 1 month ago, she tripped over a broom.  Was seen in the ED and diagnosed with left distal fibula fracture.  Was placed in a splint and given crutches.  She presents today for follow up.  Reports she does have pain to the lateral aspect of her ankle, no pain otherwise.  Admits to sensation being intact and being able to move her digits of the left lower extremity.    Past medical history:   Hypertension-takes lisinopril  Takes clonazepam as needed  Denies history of heart disease, lung disease, diabetes    Allergies:   Morphine-itching to face and lips    Surgeries:   Cervical decompression 2024  Two hysterectomy  Denies history of complications with anesthesia    Social history:   Admits to use of cigarettes   Denies illicit drug use and EtOH use    ROS:  Constitutional: Denies fever chills  Cardiopulmonary: No chest pain or difficulty breathing  MSK: Pain evident at site of injury located in HPI,   Integ: No signs of abrasions or lacerations  Neuro: No numbness or tingling  Lymphatic: No swelling outside the area of injury     Current Outpatient Medications   Medication Instructions    ARIPiprazole (ABILIFY) 5 mg, Oral    buPROPion (WELLBUTRIN XL) 300 mg, Every morning    clonazePAM (KLONOPIN) 1 mg, 2 times daily    dextroamphetamine-amphetamine 30 mg Tab 1 tablet, 2 times daily    gabapentin (NEURONTIN) 100 mg, Oral, 3 times  "daily    hydroCHLOROthiazide 12.5 mg, Oral, Daily    ibuprofen (ADVIL,MOTRIN) 800 mg, Oral, 3 times daily    lisinopriL (PRINIVIL,ZESTRIL) 40 mg, Oral, Daily    ondansetron (ZOFRAN-ODT) 4 mg, Oral, Every 6 hours PRN    prazosin (MINIPRESS) 2 mg, Oral        Objective:     Visit Vitals  /75   Pulse (!) 139   Resp 18   Ht 5' 4" (1.626 m)   Wt 72.6 kg (160 lb 0.9 oz)   BMI 27.47 kg/m²     Physical Exam    General the patient is alert and oriented x3 no acute distress nontoxic-appearing appropriate affect.    Constitutional: Vital signs are examined and stable.  Resp: No signs of labored breathing                   Left lower extremity:           -Skin:  warm and dry; no abrasion or lacerations.            -MSK: Hip and Knee F/E, EHL/FHL, Gastroc/Tib anterior motor intact.  No painful or prominent hardware.           -Neuro:  Sensation intact to light touch L3-S1 dermatomes           -Lymphatic:  Nonpitting edema at surgical site           -CV:  Posterior tibialis pulse palpable.  Compartments soft and compressible        Body mass index is 27.47 kg/m².  Ideal body weight: 54.7 kg (120 lb 9.5 oz)  Adjusted ideal body weight: 61.9 kg (136 lb 6 oz)  Hgb   Date Value Ref Range Status   03/26/2025 12.0 12.0 - 16.0 g/dL Final   11/03/2024 13.8 12.0 - 16.0 g/dL Final     Hct   Date Value Ref Range Status   03/26/2025 37.6 37.0 - 47.0 % Final   11/03/2024 41.5 37.0 - 47.0 % Final     No results found for: "[id number]"  WBC   Date Value Ref Range Status   03/26/2025 7.29 4.50 - 11.50 x10(3)/mcL Final   11/03/2024 9.83 4.50 - 11.50 x10(3)/mcL Final       Radiology:   XR left ankle:  distal fibula fx noted; no mortise disruption noted on stress views.  No other osseous abnormality noted.     Assessment:       ICD-10-CM ICD-9-CM   1. Closed fracture of distal end of left fibula, unspecified fracture morphology, initial encounter  S89.879E 824.8       Plan:     1. Closed fracture of distal end of left fibula, unspecified " fracture morphology, initial encounter  -     X-Ray Ankle Complete Left; Future; Expected date: 04/01/2025      Discussed surgical options with pt.  She does have anxiety surrounding surgery after her most recent procedure of cervical decompression and would like time to think about surgery.  Dr. Leon able to discuss surgery vs nonoperative management.  Pt would like more time to think about surgery.  Opted for nonsurgical management today.  Placed in short leg cast today.  Pt to remain NWB.  She will f/u next week for cast removal and xrays.  Will decide on definitive treatment at her visit next week.  Her fiance was present for discussion as well.  Pt verbalizes understanding and agrees with tx plan. Pt to call clinic with any questions/concerns.      The above findings, diagnostics, and treatment plan were discussed with Dr. Leon who is in agreement with the plan of care except as stated in additional documentation.     Constanza Coulter PA-C  [facility]    Future Appointments   Date Time Provider Department Center   4/17/2025  9:40 AM Susan Chun, [facility]       This note was created with the assistance of voice recognition software or phone dictation. There may be transcription errors as a result of using this technology however minimal. Effort has been made to assure accuracy of transcription but any obvious errors or omissions should be clarified with the author of the document.

## 2025-04-09 ENCOUNTER — HOSPITAL ENCOUNTER (OUTPATIENT)
Dept: RADIOLOGY | Facility: CLINIC | Age: 50
Discharge: HOME | End: 2025-04-09
Attending: ORTHOPAEDIC SURGERY
Payer: COMMERCIAL

## 2025-04-09 ENCOUNTER — OFFICE VISIT (OUTPATIENT)
Dept: ORTHOPEDICS | Facility: CLINIC | Age: 50
End: 2025-04-09
Payer: COMMERCIAL

## 2025-04-09 VITALS — WEIGHT: 158.75 LBS | HEIGHT: 64 IN | BODY MASS INDEX: 27.1 KG/M2

## 2025-04-09 DIAGNOSIS — S82.832D CLOSED FRACTURE OF DISTAL END OF LEFT FIBULA WITH ROUTINE HEALING, UNSPECIFIED FRACTURE MORPHOLOGY, SUBSEQUENT ENCOUNTER: Primary | ICD-10-CM

## 2025-04-09 DIAGNOSIS — S82.832D CLOSED FRACTURE OF DISTAL END OF LEFT FIBULA WITH ROUTINE HEALING, UNSPECIFIED FRACTURE MORPHOLOGY, SUBSEQUENT ENCOUNTER: ICD-10-CM

## 2025-04-09 PROCEDURE — 73610 X-RAY EXAM OF ANKLE: CPT | Mod: LT,,, | Performed by: ORTHOPAEDIC SURGERY

## 2025-04-09 NOTE — PROGRESS NOTES
"[facility]      Name: Dahiana Rivera  : 1975  MRN: [id number]  Date: 2025    Chief Complaint   Patient presents with    Left Ankle - Injury     2 week f/u from left distal fibula fx. Remains NWB. Reports intermittent swelling and discomfort improved with elevation.        Subjective:      Chief Complaint   Patient presents with    Left Ankle - Injury     2 week f/u from left distal fibula fx. Remains NWB. Reports intermittent swelling and discomfort improved with elevation.         Injury      Dahiana Rivera is a 49 y.o. female presents to clinic for follow up of left distal fibula fracture.  Was seen in clinic last week.  After discussing surgical vs nonsurgical options, she wanted time to think about it.  She presents today for alignment check and discussion of further intervention.  Pt states she would like to proceed with nonoperative management of her fracture.  She declines surgical intervention.  Reports she feels well today and has been compliant with nonweightbearing status.  Using crutches for ambulation.  Has been elevating her ankle which helps with pain/swelling.  No other complaints at this time.       ROS:  Constitutional: Denies fever chills  Cardiopulmonary: No chest pain or difficulty breathing  MSK: Pain evident at site of injury located in HPI,   Integ: No signs of abrasions or lacerations  Neuro: No numbness or tingling  Lymphatic: No swelling outside the area of injury     Current Outpatient Medications   Medication Instructions    buPROPion (WELLBUTRIN XL) 300 mg, Every morning    clonazePAM (KLONOPIN) 1 mg, 2 times daily    dextroamphetamine-amphetamine 30 mg Tab 1 tablet, 2 times daily    hydroCHLOROthiazide 12.5 mg, Oral, Daily    ibuprofen (ADVIL,MOTRIN) 800 mg, Oral, 3 times daily    lisinopriL (PRINIVIL,ZESTRIL) 40 mg, Oral, Daily        Objective:     Visit Vitals  Ht 5' 4" (1.626 m)   Wt 72 kg (158 lb 11.7 oz)   BMI 27.25 kg/m²     Physical " "Exam    General the patient is alert and oriented x3 no acute distress nontoxic-appearing appropriate affect.    Constitutional: Vital signs are examined and stable.  Resp: No signs of labored breathing                   Left lower extremity:           -Skin:  warm and dry; no abrasion or lacerations.            -MSK: Hip and Knee F/E, EHL/FHL, Gastroc/Tib anterior motor intact.  No TTP of ankle/foot           -Neuro:  Sensation intact to light touch L3-S1 dermatomes           -Lymphatic:  Nonpitting edema at ankle           -CV:  Posterior tibialis pulse palpable.  Compartments soft and compressible        Body mass index is 27.25 kg/m².  Ideal body weight: 54.7 kg (120 lb 9.5 oz)  Adjusted ideal body weight: 61.6 kg (135 lb 13.6 oz)  Hgb   Date Value Ref Range Status   03/26/2025 12.0 12.0 - 16.0 g/dL Final   11/03/2024 13.8 12.0 - 16.0 g/dL Final     Hct   Date Value Ref Range Status   03/26/2025 37.6 37.0 - 47.0 % Final   11/03/2024 41.5 37.0 - 47.0 % Final     No results found for: "[id number]"  WBC   Date Value Ref Range Status   03/26/2025 7.29 4.50 - 11.50 x10(3)/mcL Final   11/03/2024 9.83 4.50 - 11.50 x10(3)/mcL Final       Radiology:   XR left ankle:  distal fibula fx noted; no displacement compared to prior films.  No other osseous abnormality noted.     Assessment:       ICD-10-CM ICD-9-CM   1. Closed fracture of distal end of left fibula with routine healing, unspecified fracture morphology, subsequent encounter  S82.832D V54.16       Plan:     1. Closed fracture of distal end of left fibula with routine healing, unspecified fracture morphology, subsequent encounter  -     X-Ray Ankle Complete Left; Future; Expected date: 04/09/2025      After discussion of options, pt would like to continue with nonoperative management of fx.  Cast exchanged today; short leg fiberglass cast.  She will f/u in 3 weeks for repeat eval and imaging.  At that time, will plan to transition her ot boot and WBAT as long as " her fx is stable.  Her fiance was present for discussion as well.  Pt verbalizes understanding and agrees with tx plan. Pt to call clinic with any questions/concerns.      The above findings, diagnostics, and treatment plan were discussed with Dr. Leon who is in agreement with the plan of care except as stated in additional documentation.     Ashley Gunther, PA-C [facility]    Future Appointments   Date Time Provider Department Center   4/17/2025  9:40 AM Susan Chun [facility]    5/1/2025  9:45 AM [facility] Leon MD [facility] [place]       This note was created with the assistance of voice recognition software or phone dictation. There may be transcription errors as a result of using this technology however minimal. Effort has been made to assure accuracy of transcription but any obvious errors or omissions should be clarified with the author of the document.

## 2025-04-23 ENCOUNTER — HOSPITAL ENCOUNTER (OUTPATIENT)
Dept: RADIOLOGY | Facility: CLINIC | Age: 50
Discharge: HOME | End: 2025-04-23
Payer: COMMERCIAL

## 2025-04-23 ENCOUNTER — OFFICE VISIT (OUTPATIENT)
Dept: ORTHOPEDICS | Facility: CLINIC | Age: 50
End: 2025-04-23
Payer: COMMERCIAL

## 2025-04-23 VITALS — BODY MASS INDEX: 27.1 KG/M2 | WEIGHT: 158.75 LBS | HEIGHT: 64 IN | RESPIRATION RATE: 18 BRPM

## 2025-04-23 DIAGNOSIS — S82.832D CLOSED FRACTURE OF DISTAL END OF LEFT FIBULA WITH ROUTINE HEALING, UNSPECIFIED FRACTURE MORPHOLOGY, SUBSEQUENT ENCOUNTER: Primary | ICD-10-CM

## 2025-04-23 DIAGNOSIS — S82.832D CLOSED FRACTURE OF DISTAL END OF LEFT FIBULA WITH ROUTINE HEALING, UNSPECIFIED FRACTURE MORPHOLOGY, SUBSEQUENT ENCOUNTER: ICD-10-CM

## 2025-04-23 PROCEDURE — 73610 X-RAY EXAM OF ANKLE: CPT | Mod: LT,,,

## 2025-04-23 NOTE — PROGRESS NOTES
"[facility]      Name: Dahiana Rivera  : 1975  MRN: [id number]  Date: 2025    Chief Complaint   Patient presents with    Left Ankle - Cast Removal     Patient calls in reports cast got wet.  Cast removed.  Xray taken       Subjective:      Chief Complaint   Patient presents with    Left Ankle - Cast Removal     Patient calls in reports cast got wet.  Cast removed.  Xray taken        Injury    Cast Removal    Dahiana Rivera is a 49 y.o. female presents to clinic earlier than scheduled follow up of left distal fibula fracture.  Approximately 3 weeks out from injury.  Pt reports her cast got wet.  She also admits to putting weight on her heel.  No other complaints.  Reports pain is improving.     ROS:  Constitutional: Denies fever chills  Cardiopulmonary: No chest pain or difficulty breathing  MSK: Pain evident at site of injury located in HPI,   Integ: No signs of abrasions or lacerations  Neuro: No numbness or tingling  Lymphatic: No swelling outside the area of injury     Current Outpatient Medications   Medication Instructions    buPROPion (WELLBUTRIN XL) 300 mg, Every morning    clonazePAM (KLONOPIN) 1 mg, 2 times daily    dextroamphetamine-amphetamine 30 mg Tab 1 tablet, 2 times daily    hydroCHLOROthiazide 12.5 mg, Oral, Daily    ibuprofen (ADVIL,MOTRIN) 800 mg, Oral, 3 times daily    lisinopriL (PRINIVIL,ZESTRIL) 40 mg, Oral, Daily        Objective:     Visit Vitals  Resp 18   Ht 5' 4" (1.626 m)   Wt 72 kg (158 lb 11.7 oz)   BMI 27.25 kg/m²     Physical Exam    General the patient is alert and oriented x3 no acute distress nontoxic-appearing appropriate affect.    Constitutional: Vital signs are examined and stable.  Resp: No signs of labored breathing                   Left lower extremity:           -Skin:  warm and dry; no abrasion or lacerations.            -MSK: Hip and Knee F/E, EHL/FHL, Gastroc/Tib anterior motor intact.  No TTP of ankle/foot. No mobility of distal " "fibula with palpation.            -Neuro:  Sensation intact to light touch L3-S1 dermatomes           -Lymphatic:  Nonpitting edema at ankle           -CV:  Posterior tibialis pulse palpable.  Compartments soft and compressible        Body mass index is 27.25 kg/m².  Ideal body weight: 54.7 kg (120 lb 9.5 oz)  Adjusted ideal body weight: 61.6 kg (135 lb 13.6 oz)  Hgb   Date Value Ref Range Status   03/26/2025 12.0 12.0 - 16.0 g/dL Final   11/03/2024 13.8 12.0 - 16.0 g/dL Final     Hct   Date Value Ref Range Status   03/26/2025 37.6 37.0 - 47.0 % Final   11/03/2024 41.5 37.0 - 47.0 % Final     No results found for: "[id number]"  WBC   Date Value Ref Range Status   03/26/2025 7.29 4.50 - 11.50 x10(3)/mcL Final   11/03/2024 9.83 4.50 - 11.50 x10(3)/mcL Final       Radiology:   XR left ankle:  distal fibula fx noted; no displacement compared to prior films.  Callus formation noted.  No other osseous abnormality noted.     Assessment:       ICD-10-CM ICD-9-CM   1. Closed fracture of distal end of left fibula with routine healing, unspecified fracture morphology, subsequent encounter  S82.832D V54.16       Plan:     1. Closed fracture of distal end of left fibula with routine healing, unspecified fracture morphology, subsequent encounter  -     X-Ray Ankle Complete Left; Future; Expected date: 04/23/2025      Against medical advice, pt has been weightbearing to her heel.  Fortunately, there has been no displacement to fracture.  Cast discontinued.  Cam boot dispensed.  Discussed the need to remain NWB for another week.  She has her scheduled f/u on may 1st.  She will cont with this f/u appt and we will discuss beginning WB at that time.  Pt verbalizes understanding and agrees with tx plan. Pt to call clinic with any questions/concerns.      The above findings, diagnostics, and treatment plan were discussed with Dr. Leon who is in agreement with the plan of care except as stated in additional documentation.     Constanza " Gunther, PA-C [facility]    Future Appointments   Date Time Provider Department Center   5/1/2025  9:45 AM Santos Leon MD [facility] [place]       This note was created with the assistance of voice recognition software or phone dictation. There may be transcription errors as a result of using this technology however minimal. Effort has been made to assure accuracy of transcription but any obvious errors or omissions should be clarified with the author of the document.

## 2025-05-01 ENCOUNTER — HOSPITAL ENCOUNTER (OUTPATIENT)
Dept: RADIOLOGY | Facility: CLINIC | Age: 50
Discharge: HOME | End: 2025-05-01
Attending: ORTHOPAEDIC SURGERY
Payer: COMMERCIAL

## 2025-05-01 ENCOUNTER — OFFICE VISIT (OUTPATIENT)
Dept: ORTHOPEDICS | Facility: CLINIC | Age: 50
End: 2025-05-01
Payer: COMMERCIAL

## 2025-05-01 VITALS
HEART RATE: 98 BPM | HEIGHT: 64 IN | WEIGHT: 158.75 LBS | SYSTOLIC BLOOD PRESSURE: 104 MMHG | BODY MASS INDEX: 27.1 KG/M2 | DIASTOLIC BLOOD PRESSURE: 69 MMHG

## 2025-05-01 DIAGNOSIS — S82.832D CLOSED FRACTURE OF DISTAL END OF LEFT FIBULA WITH ROUTINE HEALING, UNSPECIFIED FRACTURE MORPHOLOGY, SUBSEQUENT ENCOUNTER: ICD-10-CM

## 2025-05-01 DIAGNOSIS — S82.832D CLOSED FRACTURE OF DISTAL END OF LEFT FIBULA WITH ROUTINE HEALING, UNSPECIFIED FRACTURE MORPHOLOGY, SUBSEQUENT ENCOUNTER: Primary | ICD-10-CM

## 2025-05-01 PROCEDURE — 73610 X-RAY EXAM OF ANKLE: CPT | Mod: LT,,, | Performed by: ORTHOPAEDIC SURGERY

## 2025-05-01 RX ORDER — QUETIAPINE 300 MG/1
300 TABLET, FILM COATED, EXTENDED RELEASE ORAL DAILY
COMMUNITY

## 2025-05-01 RX ORDER — METHOCARBAMOL 750 MG/1
750 TABLET, FILM COATED ORAL 3 TIMES DAILY
Qty: 30 TABLET | Refills: 0 | Status: SHIPPED | OUTPATIENT
Start: 2025-05-01 | End: 2025-05-11

## 2025-05-01 NOTE — PROGRESS NOTES
[facility]      Name: Dahiana Rivera  : 1975  MRN: [id number]  Date: 2025    Chief Complaint   Patient presents with    Left Ankle - Follow-up     5 wks f/u left distal fibula fx, er 3/26/25, nwb, ambulates with crutches, ready to d/c boot, reports moderate pain, some improvement,        Subjective:      Chief Complaint   Patient presents with    Left Ankle - Follow-up     5 wks f/u left distal fibula fx, er 3/26/25, nwb, ambulates with crutches, ready to d/c boot, reports moderate pain, some improvement,         Injury    Cast Removal    Follow-up      Dahiana Rivera is a 49 y.o. female presents to clinic earlier than scheduled follow up of left distal fibula fracture.  Approximately 4 weeks out from injury.  Reports she is doing well today.  He has been ambulating with crutches.  Reports her pain has improved significantly although she still has some on the lateral aspect of her ankle.  Performs range of motion of ankle without increased pain.  She does experience muscle spasms from time to time in her left lower leg.  Eager to ambulate.  No other complaints at this time.    ROS:  Constitutional: Denies fever chills  Cardiopulmonary: No chest pain or difficulty breathing  MSK: Pain evident at site of injury located in HPI,   Integ: No signs of abrasions or lacerations  Neuro: No numbness or tingling  Lymphatic: No swelling outside the area of injury     Current Outpatient Medications   Medication Instructions    buPROPion (WELLBUTRIN XL) 300 mg, Every morning    clonazePAM (KLONOPIN) 1 mg, 2 times daily    dextroamphetamine-amphetamine 30 mg Tab 1 tablet, 2 times daily    hydroCHLOROthiazide 12.5 mg, Oral, Daily    ibuprofen (ADVIL,MOTRIN) 800 mg, Oral, 3 times daily    lisinopriL (PRINIVIL,ZESTRIL) 40 mg, Oral, Daily    methocarbamoL (ROBAXIN) 750 mg, Oral, 3 times daily    QUEtiapine (SEROQUEL XR) 300 mg, Daily        Objective:     Visit Vitals  /69   Pulse 98   Ht  "5' 4" (1.626 m)   Wt 72 kg (158 lb 11.7 oz)   BMI 27.25 kg/m²     Physical Exam    General the patient is alert and oriented x3 no acute distress nontoxic-appearing appropriate affect.    Constitutional: Vital signs are examined and stable.  Resp: No signs of labored breathing                   Left lower extremity:           -Skin:  warm and dry; no abrasion or lacerations.            -MSK: Hip and Knee F/E, EHL/FHL, Gastroc/Tib anterior motor intact.  No TTP of ankle/foot. No mobility of distal fibula with palpation.            -Neuro:  Sensation intact to light touch L3-S1 dermatomes           -Lymphatic:  Nonpitting edema at ankle           -CV:  Posterior tibialis pulse palpable.  Compartments soft and compressible        Body mass index is 27.25 kg/m².  Ideal body weight: 54.7 kg (120 lb 9.5 oz)  Adjusted ideal body weight: 61.6 kg (135 lb 13.6 oz)  Hgb   Date Value Ref Range Status   03/26/2025 12.0 12.0 - 16.0 g/dL Final   11/03/2024 13.8 12.0 - 16.0 g/dL Final     Hct   Date Value Ref Range Status   03/26/2025 37.6 37.0 - 47.0 % Final   11/03/2024 41.5 37.0 - 47.0 % Final     No results found for: "[id number]"  WBC   Date Value Ref Range Status   03/26/2025 7.29 4.50 - 11.50 x10(3)/mcL Final   11/03/2024 9.83 4.50 - 11.50 x10(3)/mcL Final       Radiology:   XR left ankle:  distal fibula fx noted; no displacement compared to prior films.  Continued callus formation noted.  No other osseous abnormality noted.     Assessment:       ICD-10-CM ICD-9-CM   1. Closed fracture of distal end of left fibula with routine healing, unspecified fracture morphology, subsequent encounter  S82.832D V54.16       Plan:     1. Closed fracture of distal end of left fibula with routine healing, unspecified fracture morphology, subsequent encounter  -     X-Ray Ankle Complete Left; Future; Expected date: 05/01/2025  -     methocarbamoL (ROBAXIN) 750 MG Tab; Take 1 tablet (750 mg total) by mouth 3 (three) times daily. for 10 days "  Dispense: 30 tablet; Refill: 0      Patient doing well today.  She does have some muscle spasms and is agreeable to taking muscle relaxants as needed to control this.  Robaxin sent to patient's pharmacy.  She may begin weight-bearing as tolerated in her boot which was dispensed at her last visit.  Plan to have her return in 3-4 weeks for repeat evaluation and imaging.  At that time, we will discuss weaning from boot if x-rays continue to improve.  Pt verbalizes understanding and agrees with tx plan. Pt to call clinic with any questions/concerns.      The above findings, diagnostics, and treatment plan were discussed with Dr. Leon who is in agreement with the plan of care except as stated in additional documentation.     Ashley Gunther, PA-C [facility]    Future Appointments   Date Time Provider Department Center   6/4/2025  1:00 PM Santos Leon MD [facility] [place]       This note was created with the assistance of voice recognition software or phone dictation. There may be transcription errors as a result of using this technology however minimal. Effort has been made to assure accuracy of transcription but any obvious errors or omissions should be clarified with the author of the document.

## 2025-06-04 ENCOUNTER — HOSPITAL ENCOUNTER (OUTPATIENT)
Dept: RADIOLOGY | Facility: CLINIC | Age: 50
Discharge: HOME | End: 2025-06-04
Attending: ORTHOPAEDIC SURGERY
Payer: COMMERCIAL

## 2025-06-04 ENCOUNTER — OFFICE VISIT (OUTPATIENT)
Dept: ORTHOPEDICS | Facility: CLINIC | Age: 50
End: 2025-06-04
Payer: COMMERCIAL

## 2025-06-04 VITALS
DIASTOLIC BLOOD PRESSURE: 84 MMHG | WEIGHT: 158.75 LBS | HEART RATE: 89 BPM | RESPIRATION RATE: 18 BRPM | SYSTOLIC BLOOD PRESSURE: 138 MMHG | BODY MASS INDEX: 27.1 KG/M2 | HEIGHT: 64 IN

## 2025-06-04 DIAGNOSIS — S82.832D CLOSED FRACTURE OF DISTAL END OF LEFT FIBULA WITH ROUTINE HEALING, UNSPECIFIED FRACTURE MORPHOLOGY, SUBSEQUENT ENCOUNTER: ICD-10-CM

## 2025-06-04 DIAGNOSIS — S82.832D CLOSED FRACTURE OF DISTAL END OF LEFT FIBULA WITH ROUTINE HEALING, UNSPECIFIED FRACTURE MORPHOLOGY, SUBSEQUENT ENCOUNTER: Primary | ICD-10-CM

## 2025-06-04 PROCEDURE — 73610 X-RAY EXAM OF ANKLE: CPT | Mod: LT,,, | Performed by: ORTHOPAEDIC SURGERY

## 2025-06-04 RX ORDER — METHOCARBAMOL 750 MG/1
750 TABLET, FILM COATED ORAL 3 TIMES DAILY
Qty: 30 TABLET | Refills: 0 | Status: SHIPPED | OUTPATIENT
Start: 2025-06-04 | End: 2025-06-14

## 2025-08-26 ENCOUNTER — PATIENT MESSAGE (OUTPATIENT)
Dept: ENDOCRINOLOGY | Facility: CLINIC | Age: 50
End: 2025-08-26
Payer: COMMERCIAL